# Patient Record
Sex: FEMALE | Race: WHITE | Employment: OTHER | URBAN - NONMETROPOLITAN AREA
[De-identification: names, ages, dates, MRNs, and addresses within clinical notes are randomized per-mention and may not be internally consistent; named-entity substitution may affect disease eponyms.]

---

## 2017-05-28 ENCOUNTER — HOSPITAL ENCOUNTER (EMERGENCY)
Age: 52
Discharge: HOME | End: 2017-05-28
Attending: EMERGENCY MEDICINE
Payer: COMMERCIAL

## 2017-05-28 ENCOUNTER — APPOINTMENT (OUTPATIENT)
Dept: GENERAL RADIOLOGY | Age: 52
End: 2017-05-28
Payer: COMMERCIAL

## 2017-05-28 VITALS
HEART RATE: 71 BPM | TEMPERATURE: 98 F | WEIGHT: 200 LBS | DIASTOLIC BLOOD PRESSURE: 84 MMHG | HEIGHT: 68 IN | SYSTOLIC BLOOD PRESSURE: 141 MMHG | BODY MASS INDEX: 30.31 KG/M2 | RESPIRATION RATE: 18 BRPM | OXYGEN SATURATION: 97 %

## 2017-05-28 DIAGNOSIS — R07.9 CHEST PAIN, UNSPECIFIED TYPE: Primary | ICD-10-CM

## 2017-05-28 LAB
ALBUMIN SERPL-MCNC: 4.2 G/DL (ref 3.5–5.2)
ALP BLD-CCNC: 65 U/L (ref 35–104)
ALT SERPL-CCNC: 50 U/L (ref 5–33)
ANION GAP SERPL CALCULATED.3IONS-SCNC: 12 MMOL/L (ref 7–19)
APTT: 27.8 SEC (ref 26–36.2)
AST SERPL-CCNC: 38 U/L (ref 5–32)
BASOPHILS ABSOLUTE: 0 K/UL (ref 0–0.2)
BASOPHILS RELATIVE PERCENT: 0.3 % (ref 0–1)
BILIRUB SERPL-MCNC: 0.3 MG/DL (ref 0.2–1.2)
BUN BLDV-MCNC: 8 MG/DL (ref 6–20)
CALCIUM SERPL-MCNC: 9.8 MG/DL (ref 8.6–10)
CHLORIDE BLD-SCNC: 98 MMOL/L (ref 98–111)
CO2: 26 MMOL/L (ref 22–29)
CREAT SERPL-MCNC: 0.7 MG/DL (ref 0.5–0.9)
EOSINOPHILS ABSOLUTE: 0 K/UL (ref 0–0.6)
EOSINOPHILS RELATIVE PERCENT: 0 % (ref 0–5)
GFR NON-AFRICAN AMERICAN: >60
GLUCOSE BLD-MCNC: 88 MG/DL (ref 74–109)
HCT VFR BLD CALC: 47.9 % (ref 37–47)
HEMOGLOBIN: 15.6 G/DL (ref 12–16)
INR BLD: 1.02 (ref 0.88–1.18)
LYMPHOCYTES ABSOLUTE: 3.7 K/UL (ref 1.1–4.5)
LYMPHOCYTES RELATIVE PERCENT: 36.6 % (ref 20–40)
MCH RBC QN AUTO: 31.1 PG (ref 27–31)
MCHC RBC AUTO-ENTMCNC: 32.6 G/DL (ref 33–37)
MCV RBC AUTO: 95.6 FL (ref 81–99)
MONOCYTES ABSOLUTE: 0.6 K/UL (ref 0–0.9)
MONOCYTES RELATIVE PERCENT: 6 % (ref 0–10)
NEUTROPHILS ABSOLUTE: 5.8 K/UL (ref 1.5–7.5)
NEUTROPHILS RELATIVE PERCENT: 56.9 % (ref 50–65)
PDW BLD-RTO: 12.9 % (ref 11.5–14.5)
PERFORMED ON: NORMAL
PERFORMED ON: NORMAL
PLATELET # BLD: 212 K/UL (ref 130–400)
PMV BLD AUTO: 9.1 FL (ref 7.4–10.4)
POC TROPONIN I: 0 NG/ML (ref 0–0.08)
POC TROPONIN I: 0 NG/ML (ref 0–0.08)
POTASSIUM SERPL-SCNC: 4 MMOL/L (ref 3.5–5)
PRO-BNP: 136 PG/ML (ref 0–900)
PROTHROMBIN TIME: 13.3 SEC (ref 12–14.6)
RBC # BLD: 5.01 M/UL (ref 4.2–5.4)
SODIUM BLD-SCNC: 136 MMOL/L (ref 136–145)
TOTAL PROTEIN: 8.2 G/DL (ref 6.6–8.7)
WBC # BLD: 10.1 K/UL (ref 4.8–10.8)

## 2017-05-28 PROCEDURE — 99285 EMERGENCY DEPT VISIT HI MDM: CPT

## 2017-05-28 PROCEDURE — 36415 COLL VENOUS BLD VENIPUNCTURE: CPT

## 2017-05-28 PROCEDURE — 99282 EMERGENCY DEPT VISIT SF MDM: CPT | Performed by: EMERGENCY MEDICINE

## 2017-05-28 PROCEDURE — 71010 XR CHEST PORTABLE: CPT

## 2017-05-28 PROCEDURE — 85610 PROTHROMBIN TIME: CPT

## 2017-05-28 PROCEDURE — 6370000000 HC RX 637 (ALT 250 FOR IP): Performed by: EMERGENCY MEDICINE

## 2017-05-28 PROCEDURE — 93005 ELECTROCARDIOGRAM TRACING: CPT

## 2017-05-28 PROCEDURE — 83880 ASSAY OF NATRIURETIC PEPTIDE: CPT

## 2017-05-28 PROCEDURE — 84484 ASSAY OF TROPONIN QUANT: CPT

## 2017-05-28 PROCEDURE — 85025 COMPLETE CBC W/AUTO DIFF WBC: CPT

## 2017-05-28 PROCEDURE — 85730 THROMBOPLASTIN TIME PARTIAL: CPT

## 2017-05-28 PROCEDURE — 80053 COMPREHEN METABOLIC PANEL: CPT

## 2017-05-28 RX ORDER — LEVOTHYROXINE SODIUM 0.12 MG/1
125 TABLET ORAL DAILY
COMMUNITY

## 2017-05-28 RX ORDER — ASPIRIN 325 MG
325 TABLET ORAL ONCE
Status: DISCONTINUED | OUTPATIENT
Start: 2017-05-28 | End: 2017-05-29 | Stop reason: HOSPADM

## 2017-05-28 RX ORDER — LAMOTRIGINE 200 MG/1
200 TABLET ORAL DAILY
COMMUNITY

## 2017-05-28 RX ORDER — ASPIRIN 325 MG
TABLET ORAL
Status: DISCONTINUED
Start: 2017-05-28 | End: 2017-05-28

## 2017-05-28 RX ORDER — ASPIRIN 325 MG
325 TABLET ORAL ONCE
Status: COMPLETED | OUTPATIENT
Start: 2017-05-28 | End: 2017-05-28

## 2017-05-28 RX ORDER — BUSPIRONE HYDROCHLORIDE 5 MG/1
5 TABLET ORAL 3 TIMES DAILY
COMMUNITY

## 2017-05-28 RX ADMIN — ASPIRIN 325 MG ORAL TABLET 325 MG: 325 PILL ORAL at 19:27

## 2017-05-28 ASSESSMENT — ENCOUNTER SYMPTOMS
BACK PAIN: 0
TROUBLE SWALLOWING: 0
ABDOMINAL PAIN: 0
COUGH: 0
CHEST TIGHTNESS: 0
DIARRHEA: 0
RHINORRHEA: 0
ABDOMINAL DISTENTION: 0
SHORTNESS OF BREATH: 0
EYE PAIN: 0
COLOR CHANGE: 0
PHOTOPHOBIA: 0
VOMITING: 0
WHEEZING: 0
CONSTIPATION: 0
NAUSEA: 1
SORE THROAT: 0

## 2017-05-28 ASSESSMENT — PAIN SCALES - GENERAL: PAINLEVEL_OUTOF10: 5

## 2017-05-30 ENCOUNTER — HOSPITAL ENCOUNTER (OUTPATIENT)
Dept: NON INVASIVE DIAGNOSTICS | Age: 52
End: 2017-05-30
Payer: COMMERCIAL

## 2017-05-30 ENCOUNTER — HOSPITAL ENCOUNTER (OUTPATIENT)
Dept: NUCLEAR MEDICINE | Age: 52
End: 2017-05-30
Payer: COMMERCIAL

## 2017-05-30 ENCOUNTER — APPOINTMENT (OUTPATIENT)
Dept: NUCLEAR MEDICINE | Age: 52
End: 2017-05-30
Payer: COMMERCIAL

## 2017-05-31 ENCOUNTER — HOSPITAL ENCOUNTER (OUTPATIENT)
Dept: NUCLEAR MEDICINE | Age: 52
End: 2017-05-31
Payer: COMMERCIAL

## 2017-05-31 ENCOUNTER — HOSPITAL ENCOUNTER (OUTPATIENT)
Dept: NUCLEAR MEDICINE | Age: 52
Discharge: HOME | End: 2017-05-31
Payer: COMMERCIAL

## 2017-05-31 ENCOUNTER — HOSPITAL ENCOUNTER (OUTPATIENT)
Dept: NON INVASIVE DIAGNOSTICS | Age: 52
Discharge: HOME | End: 2017-05-31
Payer: COMMERCIAL

## 2017-05-31 DIAGNOSIS — R07.9 CHEST PAIN IN ADULT: ICD-10-CM

## 2017-05-31 LAB
EKG P AXIS: 67 DEGREES
EKG P AXIS: 72 DEGREES
EKG P-R INTERVAL: 190 MS
EKG P-R INTERVAL: 192 MS
EKG Q-T INTERVAL: 408 MS
EKG Q-T INTERVAL: 422 MS
EKG QRS DURATION: 102 MS
EKG QRS DURATION: 90 MS
EKG QTC CALCULATION (BAZETT): 416 MS
EKG QTC CALCULATION (BAZETT): 427 MS
EKG T AXIS: 47 DEGREES
EKG T AXIS: 50 DEGREES

## 2017-05-31 PROCEDURE — 93017 CV STRESS TEST TRACING ONLY: CPT

## 2017-06-12 ENCOUNTER — HOSPITAL ENCOUNTER (OUTPATIENT)
Dept: NUCLEAR MEDICINE | Age: 52
Discharge: HOME | End: 2017-06-12
Payer: COMMERCIAL

## 2017-06-12 DIAGNOSIS — R07.9 CHEST PAIN IN ADULT: ICD-10-CM

## 2017-06-12 PROCEDURE — 6360000002 HC RX W HCPCS: Performed by: NURSE PRACTITIONER

## 2017-06-12 PROCEDURE — 93017 CV STRESS TEST TRACING ONLY: CPT

## 2017-06-12 PROCEDURE — A9500 TC99M SESTAMIBI: HCPCS | Performed by: NURSE PRACTITIONER

## 2017-06-12 PROCEDURE — 78452 HT MUSCLE IMAGE SPECT MULT: CPT

## 2017-06-12 PROCEDURE — 3430000000 HC RX DIAGNOSTIC RADIOPHARMACEUTICAL: Performed by: NURSE PRACTITIONER

## 2017-06-12 RX ADMIN — TETRAKIS(2-METHOXYISOBUTYLISOCYANIDE)COPPER(I) TETRAFLUOROBORATE 10 MILLICURIE: 1 INJECTION, POWDER, LYOPHILIZED, FOR SOLUTION INTRAVENOUS at 10:31

## 2017-06-12 RX ADMIN — REGADENOSON 0.4 MG: 0.08 INJECTION, SOLUTION INTRAVENOUS at 10:31

## 2017-06-12 RX ADMIN — TETRAKIS(2-METHOXYISOBUTYLISOCYANIDE)COPPER(I) TETRAFLUOROBORATE 30 MILLICURIE: 1 INJECTION, POWDER, LYOPHILIZED, FOR SOLUTION INTRAVENOUS at 10:31

## 2017-06-14 LAB
LV EF: 76 %
LVEF MODALITY: NORMAL

## 2017-06-22 ENCOUNTER — TELEPHONE (OUTPATIENT)
Dept: CARDIOLOGY | Age: 52
End: 2017-06-22

## 2017-06-22 ENCOUNTER — APPOINTMENT (OUTPATIENT)
Dept: GENERAL RADIOLOGY | Facility: HOSPITAL | Age: 52
End: 2017-06-22

## 2017-06-22 ENCOUNTER — HOSPITAL ENCOUNTER (EMERGENCY)
Facility: HOSPITAL | Age: 52
Discharge: HOME | End: 2017-06-23
Attending: EMERGENCY MEDICINE | Admitting: EMERGENCY MEDICINE

## 2017-06-22 DIAGNOSIS — R07.9 CHEST PAIN, UNSPECIFIED TYPE: Primary | ICD-10-CM

## 2017-06-22 LAB
ALBUMIN SERPL-MCNC: 4.2 G/DL (ref 3.5–5)
ALBUMIN/GLOB SERPL: 1.1 G/DL (ref 1.1–2.5)
ALP SERPL-CCNC: 66 U/L (ref 24–120)
ALT SERPL W P-5'-P-CCNC: 62 U/L (ref 0–54)
ANION GAP SERPL CALCULATED.3IONS-SCNC: 9 MMOL/L (ref 4–13)
APTT PPP: 28.9 SECONDS (ref 24.1–34.8)
AST SERPL-CCNC: 62 U/L (ref 7–45)
BASOPHILS # BLD AUTO: 0.04 10*3/MM3 (ref 0–0.2)
BASOPHILS NFR BLD AUTO: 0.4 % (ref 0–2)
BILIRUB SERPL-MCNC: 0.5 MG/DL (ref 0.1–1)
BUN BLD-MCNC: 9 MG/DL (ref 5–21)
BUN/CREAT SERPL: 13.4 (ref 7–25)
CALCIUM SPEC-SCNC: 9.9 MG/DL (ref 8.4–10.4)
CHLORIDE SERPL-SCNC: 102 MMOL/L (ref 98–110)
CK MB SERPL-CCNC: 0.43 NG/ML (ref 0–5)
CO2 SERPL-SCNC: 29 MMOL/L (ref 24–31)
CREAT BLD-MCNC: 0.67 MG/DL (ref 0.5–1.4)
DEPRECATED RDW RBC AUTO: 44.4 FL (ref 40–54)
EOSINOPHIL # BLD AUTO: 0.49 10*3/MM3 (ref 0–0.7)
EOSINOPHIL NFR BLD AUTO: 4.6 % (ref 0–4)
ERYTHROCYTE [DISTWIDTH] IN BLOOD BY AUTOMATED COUNT: 12.9 % (ref 12–15)
GFR SERPL CREATININE-BSD FRML MDRD: 92 ML/MIN/1.73
GLOBULIN UR ELPH-MCNC: 3.7 GM/DL
GLUCOSE BLD-MCNC: 90 MG/DL (ref 70–100)
HCT VFR BLD AUTO: 44.3 % (ref 37–47)
HGB BLD-MCNC: 14.8 G/DL (ref 12–16)
HOLD SPECIMEN: NORMAL
HOLD SPECIMEN: NORMAL
IMM GRANULOCYTES # BLD: 0.01 10*3/MM3 (ref 0–0.03)
IMM GRANULOCYTES NFR BLD: 0.1 % (ref 0–5)
INR PPP: 0.92 (ref 0.91–1.09)
LYMPHOCYTES # BLD AUTO: 3.71 10*3/MM3 (ref 0.72–4.86)
LYMPHOCYTES NFR BLD AUTO: 34.6 % (ref 15–45)
MCH RBC QN AUTO: 31.1 PG (ref 28–32)
MCHC RBC AUTO-ENTMCNC: 33.4 G/DL (ref 33–36)
MCV RBC AUTO: 93.1 FL (ref 82–98)
MONOCYTES # BLD AUTO: 0.67 10*3/MM3 (ref 0.19–1.3)
MONOCYTES NFR BLD AUTO: 6.3 % (ref 4–12)
MYOGLOBIN SERPL-MCNC: 13.7 NG/ML (ref 0–110)
NEUTROPHILS # BLD AUTO: 5.79 10*3/MM3 (ref 1.87–8.4)
NEUTROPHILS NFR BLD AUTO: 54 % (ref 39–78)
NT-PROBNP SERPL-MCNC: 96.8 PG/ML (ref 0–900)
PLATELET # BLD AUTO: 186 10*3/MM3 (ref 130–400)
PMV BLD AUTO: 9.4 FL (ref 6–12)
POTASSIUM BLD-SCNC: 3.9 MMOL/L (ref 3.5–5.3)
PROT SERPL-MCNC: 7.9 G/DL (ref 6.3–8.7)
PROTHROMBIN TIME: 12.6 SECONDS (ref 11.9–14.6)
RBC # BLD AUTO: 4.76 10*6/MM3 (ref 4.2–5.4)
SODIUM BLD-SCNC: 140 MMOL/L (ref 135–145)
TROPONIN I SERPL-MCNC: 0 NG/ML (ref 0–0.07)
WBC NRBC COR # BLD: 10.71 10*3/MM3 (ref 4.8–10.8)
WHOLE BLOOD HOLD SPECIMEN: NORMAL
WHOLE BLOOD HOLD SPECIMEN: NORMAL

## 2017-06-22 PROCEDURE — 83880 ASSAY OF NATRIURETIC PEPTIDE: CPT | Performed by: EMERGENCY MEDICINE

## 2017-06-22 PROCEDURE — 80053 COMPREHEN METABOLIC PANEL: CPT | Performed by: EMERGENCY MEDICINE

## 2017-06-22 PROCEDURE — 83874 ASSAY OF MYOGLOBIN: CPT | Performed by: EMERGENCY MEDICINE

## 2017-06-22 PROCEDURE — 93010 ELECTROCARDIOGRAM REPORT: CPT | Performed by: INTERNAL MEDICINE

## 2017-06-22 PROCEDURE — 99284 EMERGENCY DEPT VISIT MOD MDM: CPT

## 2017-06-22 PROCEDURE — 84484 ASSAY OF TROPONIN QUANT: CPT

## 2017-06-22 PROCEDURE — 93005 ELECTROCARDIOGRAM TRACING: CPT | Performed by: EMERGENCY MEDICINE

## 2017-06-22 PROCEDURE — 71010 HC CHEST PA OR AP: CPT

## 2017-06-22 PROCEDURE — 85610 PROTHROMBIN TIME: CPT | Performed by: EMERGENCY MEDICINE

## 2017-06-22 PROCEDURE — 85025 COMPLETE CBC W/AUTO DIFF WBC: CPT | Performed by: EMERGENCY MEDICINE

## 2017-06-22 PROCEDURE — 85730 THROMBOPLASTIN TIME PARTIAL: CPT | Performed by: EMERGENCY MEDICINE

## 2017-06-22 PROCEDURE — 82553 CREATINE MB FRACTION: CPT | Performed by: EMERGENCY MEDICINE

## 2017-06-22 RX ORDER — LEVOTHYROXINE SODIUM 0.12 MG/1
125 TABLET ORAL DAILY
COMMUNITY

## 2017-06-22 RX ORDER — CLONAZEPAM 1 MG/1
1 TABLET ORAL 3 TIMES DAILY PRN
COMMUNITY
End: 2019-07-19 | Stop reason: DRUGHIGH

## 2017-06-22 RX ORDER — MELOXICAM 15 MG/1
15 TABLET ORAL DAILY
COMMUNITY
End: 2019-07-19 | Stop reason: ALTCHOICE

## 2017-06-23 ENCOUNTER — OFFICE VISIT (OUTPATIENT)
Dept: CARDIOLOGY | Facility: CLINIC | Age: 52
End: 2017-06-23

## 2017-06-23 VITALS
WEIGHT: 200 LBS | SYSTOLIC BLOOD PRESSURE: 128 MMHG | TEMPERATURE: 97.5 F | RESPIRATION RATE: 16 BRPM | DIASTOLIC BLOOD PRESSURE: 72 MMHG | OXYGEN SATURATION: 99 % | BODY MASS INDEX: 30.31 KG/M2 | HEART RATE: 74 BPM | HEIGHT: 68 IN

## 2017-06-23 VITALS
HEART RATE: 101 BPM | DIASTOLIC BLOOD PRESSURE: 90 MMHG | SYSTOLIC BLOOD PRESSURE: 130 MMHG | OXYGEN SATURATION: 93 % | HEIGHT: 68 IN | WEIGHT: 208 LBS | BODY MASS INDEX: 31.52 KG/M2

## 2017-06-23 DIAGNOSIS — R07.2 PRECORDIAL PAIN: ICD-10-CM

## 2017-06-23 DIAGNOSIS — R94.39 ABNORMAL NUCLEAR STRESS TEST: ICD-10-CM

## 2017-06-23 DIAGNOSIS — I10 ESSENTIAL HYPERTENSION: Primary | ICD-10-CM

## 2017-06-23 DIAGNOSIS — Z82.49 FAMILY HISTORY OF EARLY CAD: ICD-10-CM

## 2017-06-23 DIAGNOSIS — F17.200 SMOKER: ICD-10-CM

## 2017-06-23 LAB — TROPONIN I SERPL-MCNC: 0 NG/ML (ref 0–0.07)

## 2017-06-23 PROCEDURE — 99214 OFFICE O/P EST MOD 30 MIN: CPT | Performed by: INTERNAL MEDICINE

## 2017-06-23 PROCEDURE — 84484 ASSAY OF TROPONIN QUANT: CPT

## 2017-06-23 PROCEDURE — 93005 ELECTROCARDIOGRAM TRACING: CPT | Performed by: EMERGENCY MEDICINE

## 2017-06-23 PROCEDURE — 93000 ELECTROCARDIOGRAM COMPLETE: CPT | Performed by: INTERNAL MEDICINE

## 2017-06-23 PROCEDURE — 93010 ELECTROCARDIOGRAM REPORT: CPT | Performed by: INTERNAL MEDICINE

## 2017-06-23 RX ORDER — NITROGLYCERIN 0.4 MG/1
TABLET SUBLINGUAL
Refills: 0 | COMMUNITY
Start: 2017-06-19

## 2017-06-23 RX ORDER — ATENOLOL 25 MG/1
25 TABLET ORAL DAILY
Qty: 30 TABLET | Refills: 11 | Status: SHIPPED | OUTPATIENT
Start: 2017-06-23 | End: 2019-07-19 | Stop reason: ALTCHOICE

## 2017-06-23 RX ORDER — SODIUM CHLORIDE 0.9 % (FLUSH) 0.9 %
1-10 SYRINGE (ML) INJECTION AS NEEDED
Status: CANCELLED | OUTPATIENT
Start: 2017-06-23

## 2017-06-23 RX ORDER — SODIUM CHLORIDE 9 MG/ML
3 INJECTION, SOLUTION INTRAVENOUS ONCE
Status: CANCELLED | OUTPATIENT
Start: 2017-06-23 | End: 2017-06-23

## 2017-06-23 RX ORDER — IPRATROPIUM BROMIDE 17 UG/1
AEROSOL, METERED RESPIRATORY (INHALATION)
Refills: 5 | COMMUNITY
Start: 2017-06-05 | End: 2019-07-19 | Stop reason: ALTCHOICE

## 2017-06-23 RX ORDER — METAXALONE 800 MG/1
800 TABLET ORAL DAILY PRN
Refills: 1 | COMMUNITY
Start: 2017-06-07 | End: 2019-07-19

## 2017-06-23 NOTE — ED NOTES
EXPLAINED TO PT THAT IT IS REQUIRED TO KEEP HER ON A MONITOR SINCE SHE IS HERE FOR CP. THIS NURSE CONNECTED PT BACK TO MONITOR.     Rama Gutierrez, ANDREA  06/22/17 [id number]

## 2017-06-23 NOTE — PROGRESS NOTES
Judie Alba  [id number]  1965  51 y.o.  female    Referring Provider: Brian Weston MD    Reason for Visit:Chest pain  Chest pain with exertion  Moderate pressure like x 1 year now worsening  Lasts for 5 minutes  Associated diaphoresis  Associated nausea  Left arm and left jaw radiation  Precipitated with exertion  Mild dypnea   Compliant with medications    History of present illness:  Judie Alba is a 51 y.o. yo female with history of HTN who presents today for   Chief Complaint   Patient presents with   • ABNORMAL STRESS TEST     NEW PT   • Hypertension   • Chest Pain   .    History  Past Medical History:   Diagnosis Date   • Anxiety    • Arthritis    • Chest pain    • Hepatitis C    • HTN (hypertension)    • Hypothyroid    ,   Past Surgical History:   Procedure Laterality Date   • CHOLECYSTECTOMY     • DILATION AND CURETTAGE, DIAGNOSTIC / THERAPEUTIC     • ENDOMETRIAL ABLATION     ,   Family History   Problem Relation Age of Onset   • Heart disease Mother    • Hyperlipidemia Mother    • Hypertension Mother    • Heart disease Father    • Hyperlipidemia Father    • Hypertension Father    ,   Social History   Substance Use Topics   • Smoking status: Current Every Day Smoker     Packs/day: 2.00     Types: Cigarettes   • Smokeless tobacco: None      Comment: OCCASIONAL    • Alcohol use No   ,     Medications  Current Outpatient Prescriptions   Medication Sig Dispense Refill   • ATROVENT HFA 17 MCG/ACT inhaler INHALE 2 PUFFS PO QID  5   • CALCIUM & MAGNESIUM CARBONATES PO Take  by mouth.     • clonazePAM (KlonoPIN) 1 MG tablet Take 1 mg by mouth 3 (Three) Times a Day As Needed for Seizures.     • Digestive Enzymes (DIGESTIVE ENZYME PO) Take  by mouth.     • IODINE, KELP, PO Take  by mouth.     • levothyroxine (SYNTHROID, LEVOTHROID) 125 MCG tablet Take 125 mcg by mouth Daily.     • meloxicam (MOBIC) 15 MG tablet Take 15 mg by mouth Daily.     • metaxalone (SKELAXIN) 800 MG tablet   1   • Misc Natural  "Products (ADRENAL PO) Take  by mouth As Needed.     • nitroglycerin (NITROSTAT) 0.4 MG SL tablet TK 1 T PO UNDER TONGUE Q 5 MIN UP TO 3 TABS PRN  0   • Unable to find 1 each 1 (One) Time. CATATPLEX E DAILY     • atenolol (TENORMIN) 25 MG tablet Take 1 tablet by mouth Daily. 30 tablet 11     No current facility-administered medications for this visit.        Allergies:  Codeine    Review of Systems  Review of Systems   Constitution: Negative.   HENT: Negative.    Eyes: Negative.    Cardiovascular: Positive for chest pain and dyspnea on exertion. Negative for claudication, cyanosis, irregular heartbeat, leg swelling, near-syncope, orthopnea, palpitations, paroxysmal nocturnal dyspnea and syncope.   Respiratory: Negative.    Endocrine: Negative.    Hematologic/Lymphatic: Negative.    Skin: Negative.    Gastrointestinal: Negative for anorexia.   Genitourinary: Negative.    Neurological: Negative.    Psychiatric/Behavioral: Negative.        Objective     Physical Exam:  /90  Pulse 101  Ht 68\" (172.7 cm)  Wt 208 lb (94.3 kg)  SpO2 93%  BMI 31.63 kg/m2  Physical Exam   Constitutional: She appears well-developed.   HENT:   Head: Normocephalic.   Neck: Normal carotid pulses and no JVD present. No tracheal tenderness present. Carotid bruit is not present. No tracheal deviation and no edema present.   Cardiovascular: Regular rhythm, normal heart sounds and normal pulses.    Pulmonary/Chest: Effort normal. No stridor.   Abdominal: Soft.   Neurological: She is alert. She has normal strength. No cranial nerve deficit or sensory deficit.   Skin: Skin is warm.   Psychiatric: She has a normal mood and affect. Her speech is normal and behavior is normal.       Results Review:       ECG 12 Lead  Date/Time: 6/23/2017 9:39 AM  Performed by: STEF YOUNG  Authorized by: STEF YOUNG               Assessment/Plan   Patient Active Problem List   Diagnosis   • Chest pain   • HTN (hypertension)   • Smoker 2 PPD X 30 years   • " Family history of early CAD   • Abnormal nuclear stress test Apical ischemia Francesca       No palpitations. No significant pedal edema. Compliant with medications and diet. Latest labs and medications reviewed.  UGIE in past apparently normal a few years ago    Plan:  Recommend cardiac catheterization, selective coronary angiography, left ventriculography and percutaneous coronary intervention with application of arteriotomy hemostatic closure device.  I discussed cardiac catheterization, the procedure, risks (including bleeding, infection, vascular damage [including minor oozing, bruising, bleeding, and up to and including but not limited to the need for vascular surgery, emergency cardiothoracic surgery, contrast reaction, renal failure, respiratory failure, heart attack, stroke, arrhythmia and even death), benefits, and alternatives and the patient has voiced understanding and is willing to proceed.  No contraindication to drug eluting stent placement if required  Further recommendations pending results of cardiac catheterization    Start ASA 81 mg daily  Atenlol 25 mg daily  S/L NTG PRN for chest pain  Close follow up with you as scheduled.  Intensive factor modifications.  See order list.    Counseled regarding disease appropriate diet, fluid, caffeine, stimulants and sodium intake as well as importance of compliance to diet, exercise and regular follow up.  Avoid NSAIDS and COX2 inhibitors. Use Acetaminophen PRN.    Return in about 4 months (around 10/23/2017).

## 2017-06-23 NOTE — ED PROVIDER NOTES
Subjective   HPI Comments: Pt c/o intermittent symptoms of chest pain for the past 3 days.  This afternoon the chest pain developed again.  She took one nitroglycerin with little to no relief of the discomfort.  She did not experience any shortness of breath, nausea/vomiting or diaphoresis associated with the chest pain.  She best describes the chest pain as a pressure that is non-radiating.        History provided by:  Patient      Review of Systems   Constitutional: Negative for activity change, fatigue and fever.   HENT: Negative for congestion, ear pain, facial swelling, postnasal drip, rhinorrhea, sinus pressure, sore throat and trouble swallowing.    Eyes: Negative for photophobia and redness.   Respiratory: Negative for chest tightness, shortness of breath and wheezing.    Cardiovascular: Positive for chest pain. Negative for palpitations and leg swelling.   Gastrointestinal: Negative for abdominal distention, abdominal pain, diarrhea, nausea and vomiting.   Genitourinary: Negative for difficulty urinating, dysuria and flank pain.   Musculoskeletal: Negative for back pain and neck pain.   Skin: Negative for color change and wound.   Neurological: Negative for dizziness, speech difficulty, weakness, light-headedness and headaches.   Psychiatric/Behavioral: Negative for agitation, confusion, self-injury and suicidal ideas.       Past Medical History:   Diagnosis Date   • Anxiety    • Arthritis    • Hepatitis C    • Hypothyroid        Allergies   Allergen Reactions   • Codeine Itching       Past Surgical History:   Procedure Laterality Date   • CHOLECYSTECTOMY     • DILATION AND CURETTAGE, DIAGNOSTIC / THERAPEUTIC         History reviewed. No pertinent family history.    Social History     Social History   • Marital status: Single     Spouse name: N/A   • Number of children: N/A   • Years of education: N/A     Social History Main Topics   • Smoking status: Current Every Day Smoker     Types: Cigarettes   •  Smokeless tobacco: None      Comment: OCCASIONAL    • Alcohol use No   • Drug use: No   • Sexual activity: Defer     Other Topics Concern   • None     Social History Narrative   • None           Objective   Physical Exam   Constitutional: She is oriented to person, place, and time. She appears well-developed and well-nourished. No distress.   HENT:   Head: Normocephalic and atraumatic.   Mouth/Throat: Oropharynx is clear and moist. No oropharyngeal exudate.   Eyes: EOM are normal. Pupils are equal, round, and reactive to light.   Neck: Normal range of motion. Neck supple. No JVD present.   Cardiovascular: Normal rate, regular rhythm and normal heart sounds.  Exam reveals no friction rub.    No murmur heard.  Pulmonary/Chest: Effort normal and breath sounds normal. No respiratory distress. She has no wheezes. She has no rales.   Abdominal: Soft. Bowel sounds are normal. She exhibits no distension. There is no tenderness. There is no rebound and no guarding.   Neurological: She is alert and oriented to person, place, and time. No cranial nerve deficit.   Skin: Skin is warm and dry. No rash noted.   Psychiatric: She has a normal mood and affect. Her behavior is normal. Judgment and thought content normal.   Nursing note and vitals reviewed.      Procedures         ED Course  ED Course   Value Comment By Time   ECG 12 Lead All sinus rhythm with a rate of 72, normal axis, no acute ST elevations or depressions. Mil Restrepo MD 06/23 5935   ECG 12 Lead Repeat 12-lead normal sinus rhythm with a rate of 79, normal axis, no acute ST elevations or depressions.  No acute changes from the initial EKG. Mil Restrepo MD 06/23 0035                  MDM  Number of Diagnoses or Management Options  Chest pain, unspecified type: new and requires workup  Diagnosis management comments: The workup did not show any acute findings for patient's chest pain.  She has had 2 negative enzymes and 2 negative EKGs.  She has follow-up with  Dr. Hutchinson in the morning.  She is not currently having chest pain.  Pt instructed to return to the ED if they has any further issues or new complaints.          Amount and/or Complexity of Data Reviewed  Clinical lab tests: ordered and reviewed  Tests in the radiology section of CPT®: ordered and reviewed  Tests in the medicine section of CPT®: ordered and reviewed  Independent visualization of images, tracings, or specimens: yes    Risk of Complications, Morbidity, and/or Mortality  Presenting problems: moderate  Diagnostic procedures: moderate  Management options: moderate    Patient Progress  Patient progress: stable      Final diagnoses:   Chest pain, unspecified type            Mil Restrepo MD  06/23/17 0050

## 2017-06-28 ENCOUNTER — HOSPITAL ENCOUNTER (OUTPATIENT)
Facility: HOSPITAL | Age: 52
Discharge: HOME | End: 2017-06-28
Attending: INTERNAL MEDICINE | Admitting: INTERNAL MEDICINE

## 2017-06-28 VITALS
OXYGEN SATURATION: 96 % | HEIGHT: 68 IN | HEART RATE: 70 BPM | SYSTOLIC BLOOD PRESSURE: 121 MMHG | BODY MASS INDEX: 31.13 KG/M2 | DIASTOLIC BLOOD PRESSURE: 86 MMHG | WEIGHT: 205.38 LBS | RESPIRATION RATE: 25 BRPM | TEMPERATURE: 98.8 F

## 2017-06-28 DIAGNOSIS — R07.2 PRECORDIAL PAIN: ICD-10-CM

## 2017-06-28 PROCEDURE — 93458 L HRT ARTERY/VENTRICLE ANGIO: CPT | Performed by: INTERNAL MEDICINE

## 2017-06-28 PROCEDURE — 93010 ELECTROCARDIOGRAM REPORT: CPT | Performed by: INTERNAL MEDICINE

## 2017-06-28 PROCEDURE — 99152 MOD SED SAME PHYS/QHP 5/>YRS: CPT | Performed by: INTERNAL MEDICINE

## 2017-06-28 PROCEDURE — C1894 INTRO/SHEATH, NON-LASER: HCPCS | Performed by: INTERNAL MEDICINE

## 2017-06-28 PROCEDURE — 0 IOPAMIDOL PER 1 ML: Performed by: INTERNAL MEDICINE

## 2017-06-28 PROCEDURE — S0260 H&P FOR SURGERY: HCPCS | Performed by: INTERNAL MEDICINE

## 2017-06-28 PROCEDURE — 25010000002 MIDAZOLAM PER 1 MG: Performed by: INTERNAL MEDICINE

## 2017-06-28 PROCEDURE — C1769 GUIDE WIRE: HCPCS | Performed by: INTERNAL MEDICINE

## 2017-06-28 PROCEDURE — 93005 ELECTROCARDIOGRAM TRACING: CPT | Performed by: INTERNAL MEDICINE

## 2017-06-28 PROCEDURE — 25010000002 DIPHENHYDRAMINE PER 50 MG: Performed by: INTERNAL MEDICINE

## 2017-06-28 PROCEDURE — 25010000002 FENTANYL CITRATE (PF) 100 MCG/2ML SOLUTION: Performed by: INTERNAL MEDICINE

## 2017-06-28 RX ORDER — LIDOCAINE HYDROCHLORIDE 20 MG/ML
INJECTION, SOLUTION INFILTRATION; PERINEURAL AS NEEDED
Status: DISCONTINUED | OUTPATIENT
Start: 2017-06-28 | End: 2017-06-28 | Stop reason: HOSPADM

## 2017-06-28 RX ORDER — ACETAMINOPHEN 325 MG/1
650 TABLET ORAL EVERY 4 HOURS PRN
Status: DISCONTINUED | OUTPATIENT
Start: 2017-06-28 | End: 2017-06-28 | Stop reason: HOSPADM

## 2017-06-28 RX ORDER — SODIUM CHLORIDE 9 MG/ML
3 INJECTION, SOLUTION INTRAVENOUS ONCE
Status: COMPLETED | OUTPATIENT
Start: 2017-06-28 | End: 2017-06-28

## 2017-06-28 RX ORDER — SODIUM CHLORIDE 9 MG/ML
100 INJECTION, SOLUTION INTRAVENOUS CONTINUOUS
Status: DISCONTINUED | OUTPATIENT
Start: 2017-06-28 | End: 2017-06-28 | Stop reason: HOSPADM

## 2017-06-28 RX ORDER — SODIUM CHLORIDE 0.9 % (FLUSH) 0.9 %
1-10 SYRINGE (ML) INJECTION AS NEEDED
Status: DISCONTINUED | OUTPATIENT
Start: 2017-06-28 | End: 2017-06-28 | Stop reason: HOSPADM

## 2017-06-28 RX ORDER — ASPIRIN 81 MG/1
81 TABLET ORAL DAILY
COMMUNITY
End: 2019-07-19

## 2017-06-28 RX ORDER — DIPHENHYDRAMINE HYDROCHLORIDE 50 MG/ML
INJECTION INTRAMUSCULAR; INTRAVENOUS AS NEEDED
Status: DISCONTINUED | OUTPATIENT
Start: 2017-06-28 | End: 2017-06-28 | Stop reason: HOSPADM

## 2017-06-28 RX ORDER — FENTANYL CITRATE 50 UG/ML
INJECTION, SOLUTION INTRAMUSCULAR; INTRAVENOUS AS NEEDED
Status: DISCONTINUED | OUTPATIENT
Start: 2017-06-28 | End: 2017-06-28 | Stop reason: HOSPADM

## 2017-06-28 RX ORDER — MIDAZOLAM HYDROCHLORIDE 1 MG/ML
INJECTION INTRAMUSCULAR; INTRAVENOUS AS NEEDED
Status: DISCONTINUED | OUTPATIENT
Start: 2017-06-28 | End: 2017-06-28 | Stop reason: HOSPADM

## 2017-06-28 RX ADMIN — ACETAMINOPHEN 650 MG: 325 TABLET ORAL at 16:02

## 2017-06-28 RX ADMIN — SODIUM CHLORIDE 3 ML/KG/HR: 9 INJECTION, SOLUTION INTRAVENOUS at 12:50

## 2017-07-06 ENCOUNTER — APPOINTMENT (OUTPATIENT)
Dept: GENERAL RADIOLOGY | Facility: HOSPITAL | Age: 52
End: 2017-07-06
Attending: INTERNAL MEDICINE

## 2017-07-26 ENCOUNTER — HOSPITAL ENCOUNTER (OUTPATIENT)
Dept: GENERAL RADIOLOGY | Facility: HOSPITAL | Age: 52
End: 2017-07-26
Attending: INTERNAL MEDICINE

## 2018-03-09 ENCOUNTER — TRANSCRIBE ORDERS (OUTPATIENT)
Dept: ADMINISTRATIVE | Facility: HOSPITAL | Age: 53
End: 2018-03-09

## 2018-03-09 DIAGNOSIS — M54.5 LOW BACK PAIN, UNSPECIFIED BACK PAIN LATERALITY, UNSPECIFIED CHRONICITY, WITH SCIATICA PRESENCE UNSPECIFIED: Primary | ICD-10-CM

## 2018-03-14 ENCOUNTER — APPOINTMENT (OUTPATIENT)
Dept: MRI IMAGING | Facility: HOSPITAL | Age: 53
End: 2018-03-14

## 2018-04-03 ENCOUNTER — APPOINTMENT (OUTPATIENT)
Dept: MRI IMAGING | Facility: HOSPITAL | Age: 53
End: 2018-04-03

## 2019-07-19 ENCOUNTER — LAB (OUTPATIENT)
Dept: LAB | Facility: HOSPITAL | Age: 54
End: 2019-07-19

## 2019-07-19 ENCOUNTER — TELEPHONE (OUTPATIENT)
Dept: INTERNAL MEDICINE | Facility: CLINIC | Age: 54
End: 2019-07-19

## 2019-07-19 ENCOUNTER — OFFICE VISIT (OUTPATIENT)
Dept: INTERNAL MEDICINE | Facility: CLINIC | Age: 54
End: 2019-07-19

## 2019-07-19 VITALS
RESPIRATION RATE: 16 BRPM | HEART RATE: 76 BPM | BODY MASS INDEX: 28.81 KG/M2 | TEMPERATURE: 97.6 F | SYSTOLIC BLOOD PRESSURE: 124 MMHG | HEIGHT: 68 IN | WEIGHT: 190.13 LBS | OXYGEN SATURATION: 96 % | DIASTOLIC BLOOD PRESSURE: 80 MMHG

## 2019-07-19 DIAGNOSIS — J44.9 CHRONIC OBSTRUCTIVE PULMONARY DISEASE, UNSPECIFIED COPD TYPE (HCC): ICD-10-CM

## 2019-07-19 DIAGNOSIS — Z87.440 HISTORY OF UTI: ICD-10-CM

## 2019-07-19 DIAGNOSIS — Z79.899 CHRONIC PRESCRIPTION BENZODIAZEPINE USE: ICD-10-CM

## 2019-07-19 DIAGNOSIS — N30.01 ACUTE CYSTITIS WITH HEMATURIA: ICD-10-CM

## 2019-07-19 DIAGNOSIS — F31.9 BIPOLAR 1 DISORDER (HCC): ICD-10-CM

## 2019-07-19 DIAGNOSIS — J30.9 ALLERGIC RHINITIS, UNSPECIFIED SEASONALITY, UNSPECIFIED TRIGGER: ICD-10-CM

## 2019-07-19 DIAGNOSIS — F31.9 BIPOLAR 1 DISORDER (HCC): Primary | ICD-10-CM

## 2019-07-19 DIAGNOSIS — F43.21 GRIEF: ICD-10-CM

## 2019-07-19 DIAGNOSIS — F17.200 SMOKER: ICD-10-CM

## 2019-07-19 LAB
AMPHET+METHAMPHET UR QL: NEGATIVE
BACTERIA UR QL AUTO: ABNORMAL /HPF
BARBITURATES UR QL SCN: NEGATIVE
BENZODIAZ UR QL SCN: NEGATIVE
BILIRUB UR QL STRIP: NEGATIVE
CANNABINOIDS SERPL QL: NEGATIVE
CLARITY UR: CLEAR
COCAINE UR QL: NEGATIVE
COLOR UR: ABNORMAL
GLUCOSE UR STRIP-MCNC: NEGATIVE MG/DL
HGB UR QL STRIP.AUTO: ABNORMAL
HYALINE CASTS UR QL AUTO: ABNORMAL /LPF
KETONES UR QL STRIP: NEGATIVE
LEUKOCYTE ESTERASE UR QL STRIP.AUTO: ABNORMAL
METHADONE UR QL SCN: NEGATIVE
NITRITE UR QL STRIP: POSITIVE
OPIATES UR QL: NEGATIVE
PCP UR QL SCN: NEGATIVE
PH UR STRIP.AUTO: 5.5 [PH] (ref 5–8)
PROT UR QL STRIP: NEGATIVE
RBC # UR: ABNORMAL /HPF
REF LAB TEST METHOD: ABNORMAL
SP GR UR STRIP: 1.01 (ref 1–1.03)
SQUAMOUS #/AREA URNS HPF: ABNORMAL /HPF
UROBILINOGEN UR QL STRIP: ABNORMAL
WBC UR QL AUTO: ABNORMAL /HPF

## 2019-07-19 PROCEDURE — 80307 DRUG TEST PRSMV CHEM ANLYZR: CPT | Performed by: FAMILY MEDICINE

## 2019-07-19 PROCEDURE — 87088 URINE BACTERIA CULTURE: CPT | Performed by: FAMILY MEDICINE

## 2019-07-19 PROCEDURE — 81001 URINALYSIS AUTO W/SCOPE: CPT | Performed by: FAMILY MEDICINE

## 2019-07-19 PROCEDURE — 87086 URINE CULTURE/COLONY COUNT: CPT | Performed by: FAMILY MEDICINE

## 2019-07-19 PROCEDURE — 99204 OFFICE O/P NEW MOD 45 MIN: CPT | Performed by: FAMILY MEDICINE

## 2019-07-19 PROCEDURE — 87186 SC STD MICRODIL/AGAR DIL: CPT | Performed by: FAMILY MEDICINE

## 2019-07-19 RX ORDER — CLONAZEPAM 2 MG/1
1 TABLET ORAL 3 TIMES DAILY PRN
Refills: 0 | COMMUNITY
Start: 2019-06-14 | End: 2019-09-03 | Stop reason: SDUPTHER

## 2019-07-19 RX ORDER — CYCLOBENZAPRINE HCL 5 MG
1 TABLET ORAL EVERY 8 HOURS SCHEDULED
COMMUNITY

## 2019-07-19 RX ORDER — PAROXETINE HYDROCHLORIDE 40 MG/1
40 TABLET, FILM COATED ORAL DAILY
Refills: 5 | COMMUNITY
Start: 2019-06-12

## 2019-07-19 RX ORDER — LAMOTRIGINE 200 MG/1
1 TABLET, EXTENDED RELEASE ORAL NIGHTLY
Refills: 5 | COMMUNITY
Start: 2019-06-12

## 2019-07-19 RX ORDER — IPRATROPIUM/ALBUTEROL SULFATE 20-100 MCG
MIST INHALER (GRAM) INHALATION
Refills: 11 | COMMUNITY
Start: 2019-06-13

## 2019-07-19 RX ORDER — CLONAZEPAM 2 MG/1
1-2 TABLET ORAL 3 TIMES DAILY PRN
Qty: 90 TABLET | Refills: 0 | Status: ON HOLD | OUTPATIENT
Start: 2019-07-19 | End: 2019-08-03

## 2019-07-19 RX ORDER — BISOPROLOL FUMARATE AND HYDROCHLOROTHIAZIDE 5; 6.25 MG/1; MG/1
1 TABLET ORAL DAILY
Refills: 3 | COMMUNITY
Start: 2019-06-12

## 2019-07-19 RX ORDER — NITROFURANTOIN 25; 75 MG/1; MG/1
100 CAPSULE ORAL 2 TIMES DAILY
Qty: 10 CAPSULE | Refills: 0 | Status: SHIPPED | OUTPATIENT
Start: 2019-07-19 | End: 2019-07-24

## 2019-07-19 NOTE — TELEPHONE ENCOUNTER
----- Message from Nilesh Costa DO sent at 7/19/2019  2:52 PM CDT -----  UDS neg  Sending in macrobid for cystitis, please let her know

## 2019-07-21 LAB — BACTERIA SPEC AEROBE CULT: ABNORMAL

## 2019-07-22 ENCOUNTER — TELEPHONE (OUTPATIENT)
Dept: INTERNAL MEDICINE | Facility: CLINIC | Age: 54
End: 2019-07-22

## 2019-07-22 RX ORDER — CIPROFLOXACIN 250 MG/1
250 TABLET, FILM COATED ORAL 2 TIMES DAILY
Qty: 6 TABLET | Refills: 0 | Status: SHIPPED | OUTPATIENT
Start: 2019-07-22 | End: 2019-07-25

## 2019-07-22 NOTE — TELEPHONE ENCOUNTER
----- Message from Nilesh Costa DO sent at 7/22/2019 11:42 AM CDT -----  Patient urine culture grew Pseudomonas, so I will send in a 3-day treatment with Cipro.  Please let her know that it is been sent to the pharmacy, and she should stop her calcium supplement while she is on the antibiotic.

## 2019-07-25 ENCOUNTER — APPOINTMENT (OUTPATIENT)
Dept: PULMONOLOGY | Facility: HOSPITAL | Age: 54
End: 2019-07-25

## 2019-07-29 ENCOUNTER — TELEPHONE (OUTPATIENT)
Dept: INTERNAL MEDICINE | Facility: CLINIC | Age: 54
End: 2019-07-29

## 2019-07-29 DIAGNOSIS — E03.9 HYPOTHYROIDISM, UNSPECIFIED TYPE: Primary | ICD-10-CM

## 2019-07-29 NOTE — TELEPHONE ENCOUNTER
If you will enter the orders and let me know, I will send to [facility] for her.    ELLIOT

## 2019-07-29 NOTE — TELEPHONE ENCOUNTER
PT is wanting a blood work order sent to [facility] lab to check her Thyroid. She stated that she hasn't had any done in a while and would like to have it before she sees the doctor on 9/3.

## 2019-08-03 ENCOUNTER — HOSPITAL ENCOUNTER (INPATIENT)
Facility: HOSPITAL | Age: 54
LOS: 1 days | Discharge: HOME | End: 2019-08-04
Attending: NEUROLOGICAL SURGERY | Admitting: NEUROLOGICAL SURGERY

## 2019-08-03 DIAGNOSIS — Z74.09 DECREASED MOBILITY AND ENDURANCE: ICD-10-CM

## 2019-08-03 DIAGNOSIS — S06.5XAA SDH (SUBDURAL HEMATOMA) (HCC): Primary | ICD-10-CM

## 2019-08-03 LAB
ALBUMIN SERPL-MCNC: 3.9 G/DL (ref 3.5–5)
ALBUMIN/GLOB SERPL: 1.1 G/DL (ref 1.1–2.5)
ALP SERPL-CCNC: 60 U/L (ref 24–120)
ALT SERPL W P-5'-P-CCNC: 49 U/L (ref 0–54)
ANION GAP SERPL CALCULATED.3IONS-SCNC: 7 MMOL/L (ref 4–13)
AST SERPL-CCNC: 45 U/L (ref 7–45)
B-HCG UR QL: NEGATIVE
BILIRUB SERPL-MCNC: 0.5 MG/DL (ref 0.1–1)
BUN BLD-MCNC: 5 MG/DL (ref 5–21)
BUN/CREAT SERPL: 8.9 (ref 7–25)
CALCIUM SPEC-SCNC: 8.9 MG/DL (ref 8.4–10.4)
CHLORIDE SERPL-SCNC: 105 MMOL/L (ref 98–110)
CO2 SERPL-SCNC: 28 MMOL/L (ref 24–31)
CREAT BLD-MCNC: 0.56 MG/DL (ref 0.5–1.4)
DEPRECATED RDW RBC AUTO: 47.2 FL (ref 40–54)
ERYTHROCYTE [DISTWIDTH] IN BLOOD BY AUTOMATED COUNT: 13.2 % (ref 12–15)
GFR SERPL CREATININE-BSD FRML MDRD: 113 ML/MIN/1.73
GLOBULIN UR ELPH-MCNC: 3.4 GM/DL
GLUCOSE BLD-MCNC: 92 MG/DL (ref 70–100)
HCT VFR BLD AUTO: 42.6 % (ref 37–47)
HGB BLD-MCNC: 14.4 G/DL (ref 12–16)
MCH RBC QN AUTO: 32.5 PG (ref 28–32)
MCHC RBC AUTO-ENTMCNC: 33.8 G/DL (ref 33–36)
MCV RBC AUTO: 96.2 FL (ref 82–98)
PLATELET # BLD AUTO: 197 10*3/MM3 (ref 130–400)
PMV BLD AUTO: 8.7 FL (ref 6–12)
POTASSIUM BLD-SCNC: 4.3 MMOL/L (ref 3.5–5.3)
PROT SERPL-MCNC: 7.3 G/DL (ref 6.3–8.7)
RBC # BLD AUTO: 4.43 10*6/MM3 (ref 4.2–5.4)
SODIUM BLD-SCNC: 140 MMOL/L (ref 135–145)
WBC NRBC COR # BLD: 9.78 10*3/MM3 (ref 4.8–10.8)

## 2019-08-03 PROCEDURE — 99222 1ST HOSP IP/OBS MODERATE 55: CPT | Performed by: NEUROLOGICAL SURGERY

## 2019-08-03 PROCEDURE — 94799 UNLISTED PULMONARY SVC/PX: CPT

## 2019-08-03 PROCEDURE — 99406 BEHAV CHNG SMOKING 3-10 MIN: CPT

## 2019-08-03 PROCEDURE — 80053 COMPREHEN METABOLIC PANEL: CPT | Performed by: NEUROLOGICAL SURGERY

## 2019-08-03 PROCEDURE — 81025 URINE PREGNANCY TEST: CPT | Performed by: NEUROLOGICAL SURGERY

## 2019-08-03 PROCEDURE — 85027 COMPLETE CBC AUTOMATED: CPT | Performed by: NEUROLOGICAL SURGERY

## 2019-08-03 RX ORDER — SODIUM CHLORIDE 0.9 % (FLUSH) 0.9 %
3 SYRINGE (ML) INJECTION EVERY 12 HOURS SCHEDULED
Status: DISCONTINUED | OUTPATIENT
Start: 2019-08-03 | End: 2019-08-04 | Stop reason: HOSPADM

## 2019-08-03 RX ORDER — LORAZEPAM 2 MG/ML
2 INJECTION INTRAMUSCULAR
Status: DISCONTINUED | OUTPATIENT
Start: 2019-08-03 | End: 2019-08-04 | Stop reason: HOSPADM

## 2019-08-03 RX ORDER — LORAZEPAM 1 MG/1
2 TABLET ORAL
Status: DISCONTINUED | OUTPATIENT
Start: 2019-08-03 | End: 2019-08-04 | Stop reason: HOSPADM

## 2019-08-03 RX ORDER — METOPROLOL TARTRATE 5 MG/5ML
5 INJECTION INTRAVENOUS EVERY 6 HOURS PRN
Status: DISCONTINUED | OUTPATIENT
Start: 2019-08-03 | End: 2019-08-04 | Stop reason: HOSPADM

## 2019-08-03 RX ORDER — ONDANSETRON 4 MG/1
4 TABLET, FILM COATED ORAL EVERY 6 HOURS PRN
Status: DISCONTINUED | OUTPATIENT
Start: 2019-08-03 | End: 2019-08-04 | Stop reason: HOSPADM

## 2019-08-03 RX ORDER — OXYCODONE HYDROCHLORIDE AND ACETAMINOPHEN 5; 325 MG/1; MG/1
1 TABLET ORAL EVERY 4 HOURS PRN
Status: DISCONTINUED | OUTPATIENT
Start: 2019-08-03 | End: 2019-08-04 | Stop reason: HOSPADM

## 2019-08-03 RX ORDER — LORAZEPAM 1 MG/1
1 TABLET ORAL
Status: DISCONTINUED | OUTPATIENT
Start: 2019-08-03 | End: 2019-08-04 | Stop reason: HOSPADM

## 2019-08-03 RX ORDER — NICOTINE 21 MG/24HR
1 PATCH, TRANSDERMAL 24 HOURS TRANSDERMAL EVERY 24 HOURS
Status: DISCONTINUED | OUTPATIENT
Start: 2019-08-03 | End: 2019-08-04 | Stop reason: HOSPADM

## 2019-08-03 RX ORDER — ACETAMINOPHEN 650 MG/1
650 SUPPOSITORY RECTAL EVERY 4 HOURS PRN
Status: DISCONTINUED | OUTPATIENT
Start: 2019-08-03 | End: 2019-08-04 | Stop reason: HOSPADM

## 2019-08-03 RX ORDER — CLONAZEPAM 1 MG/1
2 TABLET ORAL EVERY 8 HOURS SCHEDULED
Status: DISCONTINUED | OUTPATIENT
Start: 2019-08-03 | End: 2019-08-03

## 2019-08-03 RX ORDER — CLONAZEPAM 1 MG/1
2 TABLET ORAL 3 TIMES DAILY PRN
Status: DISCONTINUED | OUTPATIENT
Start: 2019-08-03 | End: 2019-08-04 | Stop reason: HOSPADM

## 2019-08-03 RX ORDER — LORAZEPAM 2 MG/ML
4 INJECTION INTRAMUSCULAR
Status: DISCONTINUED | OUTPATIENT
Start: 2019-08-03 | End: 2019-08-04 | Stop reason: HOSPADM

## 2019-08-03 RX ORDER — LAMOTRIGINE 100 MG/1
200 TABLET ORAL DAILY
Status: DISCONTINUED | OUTPATIENT
Start: 2019-08-03 | End: 2019-08-04 | Stop reason: HOSPADM

## 2019-08-03 RX ORDER — IPRATROPIUM BROMIDE AND ALBUTEROL SULFATE 2.5; .5 MG/3ML; MG/3ML
3 SOLUTION RESPIRATORY (INHALATION) EVERY 4 HOURS PRN
Status: DISCONTINUED | OUTPATIENT
Start: 2019-08-03 | End: 2019-08-04 | Stop reason: HOSPADM

## 2019-08-03 RX ORDER — SODIUM CHLORIDE 0.9 % (FLUSH) 0.9 %
3-10 SYRINGE (ML) INJECTION AS NEEDED
Status: DISCONTINUED | OUTPATIENT
Start: 2019-08-03 | End: 2019-08-04 | Stop reason: HOSPADM

## 2019-08-03 RX ORDER — CYCLOBENZAPRINE HCL 5 MG
5 TABLET ORAL EVERY 8 HOURS SCHEDULED
Status: DISCONTINUED | OUTPATIENT
Start: 2019-08-03 | End: 2019-08-04 | Stop reason: HOSPADM

## 2019-08-03 RX ORDER — LORAZEPAM 1 MG/1
4 TABLET ORAL
Status: DISCONTINUED | OUTPATIENT
Start: 2019-08-03 | End: 2019-08-04 | Stop reason: HOSPADM

## 2019-08-03 RX ORDER — CLONAZEPAM 0.5 MG/1
0.5 TABLET ORAL 3 TIMES DAILY PRN
Status: DISCONTINUED | OUTPATIENT
Start: 2019-08-03 | End: 2019-08-03

## 2019-08-03 RX ORDER — ACETAMINOPHEN 325 MG/1
650 TABLET ORAL EVERY 4 HOURS PRN
Status: DISCONTINUED | OUTPATIENT
Start: 2019-08-03 | End: 2019-08-04 | Stop reason: HOSPADM

## 2019-08-03 RX ORDER — PAROXETINE HYDROCHLORIDE 20 MG/1
40 TABLET, FILM COATED ORAL DAILY
Status: DISCONTINUED | OUTPATIENT
Start: 2019-08-03 | End: 2019-08-04 | Stop reason: HOSPADM

## 2019-08-03 RX ORDER — NITROGLYCERIN 0.4 MG/1
0.4 TABLET SUBLINGUAL
Status: DISCONTINUED | OUTPATIENT
Start: 2019-08-03 | End: 2019-08-04 | Stop reason: HOSPADM

## 2019-08-03 RX ORDER — ONDANSETRON 2 MG/ML
4 INJECTION INTRAMUSCULAR; INTRAVENOUS EVERY 6 HOURS PRN
Status: DISCONTINUED | OUTPATIENT
Start: 2019-08-03 | End: 2019-08-04 | Stop reason: HOSPADM

## 2019-08-03 RX ORDER — LORAZEPAM 2 MG/ML
1 INJECTION INTRAMUSCULAR
Status: DISCONTINUED | OUTPATIENT
Start: 2019-08-03 | End: 2019-08-04 | Stop reason: HOSPADM

## 2019-08-03 RX ORDER — LEVOTHYROXINE SODIUM 0.12 MG/1
125 TABLET ORAL
Status: DISCONTINUED | OUTPATIENT
Start: 2019-08-04 | End: 2019-08-04 | Stop reason: HOSPADM

## 2019-08-03 RX ADMIN — PAROXETINE HYDROCHLORIDE 40 MG: 20 TABLET, FILM COATED ORAL at 20:01

## 2019-08-03 RX ADMIN — LAMOTRIGINE 200 MG: 100 TABLET ORAL at 20:01

## 2019-08-03 RX ADMIN — SODIUM CHLORIDE, PRESERVATIVE FREE 3 ML: 5 INJECTION INTRAVENOUS at 20:02

## 2019-08-03 RX ADMIN — OXYCODONE HYDROCHLORIDE AND ACETAMINOPHEN 1 TABLET: 5; 325 TABLET ORAL at 19:44

## 2019-08-03 RX ADMIN — CYCLOBENZAPRINE HYDROCHLORIDE 5 MG: 5 TABLET, FILM COATED ORAL at 21:00

## 2019-08-03 RX ADMIN — NICOTINE 1 PATCH: 21 PATCH, EXTENDED RELEASE TRANSDERMAL at 20:01

## 2019-08-03 NOTE — H&P
NEUROSURGERY INITIAL HOSPITAL ENCOUNTER    Assessment/Plan:   Judie Alba is a 53 y.o. female with a significant comorbidity anxiety, depression, bipolar 1, chronic alcoholism and hepatitis C.  She presents with a new problem of fall from standing height while intoxicated. Physical exam findings of ecchymosis around her right periorbital region, conjunctival hemorrhage, and bilateral Nguyen's with hyperreflexia but otherwise neurologically intact.  Their imaging shows 8 mm right acute subdural hematoma and's 2 small cerebral contusions.    Differential Diagnosis:   Subdural hematoma  TBI with cerebral contusions  Alcoholism  Bilateral hyperreflexia concerning for cervical myelopathy    Recommendations:  Judie has sustained a mild traumatic brain injury.  On the CRASH Head Injury Prognostic Score he has a predicted 14-day mortality of 5.5% and risk of unfavorable outcome at 6 months of 25.7% (http://[email or web address].[facility].ac.uk/Risk%20calculator/index.html).    - Admit to ICU  - Q1 hour neurological checks  - Call for change in GCS greater than 2 points.  - Repeat head CT in 6 hours.   - If GCS falls below 9, then candidate for ICP monitor, although evidence is equivocal  - Keppra 500 BID for seizure prophylaxis  - CIWA protocol  -Given her hyperreflexia I would like to obtain an MRI of her neck to rule out cervical stenosis.        I discussed the patients findings and my recommendations with patient, family and nursing staff    Thank you very much for this interesting consult.     Level of Risk: High due to:  severe exacerbation of chronic illness and illness with threat to life or bodily function  MDM: High Complexity  Mod = 42915, High=99223  ___________________________________________________________________    Reason for consult subdural hematoma    Chief Complaint: Headache    HPI: Judie Alba is a 53 y.o. female with a significant comorbidity anxiety, bipolar 1, alcoholism, and hepatitis C.  She was in  her normal state of health until last night approximately 1:00.  She was found by her  after falling.  She struck the right side of her face on his bedside table.  She is not aware of what the circumstances were of her fall but she does say she was intoxicated at the time.  The patient drinks approximately 48 beers per week.  She also has a history of hepatitis C.    Currently she complains of a headache predominantly on the right side with ecchymosis around the right eye and conjunctival hemorrhage.  She has no nausea or vomiting.  She is confused which is why her family triggered to an outside emergency room.  Noncontrast head CT was performed which showed a right subdural hematoma that she was transferred to [facility] for further evaluation by neurosurgery.  She denies weakness numbness or tingling in her arms.  She does have a chronic history of neck pain and sees a chiropractor.  She is able to ambulate but has significant dizziness.  She has no slurred speech.  She has no other syncopal episodes.  She does complain of some difficulty hearing this is in both sides.      Review of Systems   Constitutional: Negative.    HENT: Negative.    Eyes: Negative.    Respiratory: Negative.    Cardiovascular: Negative.    Gastrointestinal: Negative.    Endocrine: Negative.    Genitourinary: Negative.    Musculoskeletal: Negative.    Skin: Positive for wound.   Allergic/Immunologic: Negative.    Neurological: Positive for syncope.   Hematological: Negative.    Psychiatric/Behavioral: Negative.         Past Medical History:  has a past medical history of Anxiety, Arthritis, Asthma, Back pain, Bipolar 1 disorder (CMS/HCC), Chest pain, Decreased libido, Hepatitis C, HTN (hypertension), emphysema, Hypothyroid, and Stroke (CMS/HCC).    Past Surgical History:  has a past surgical history that includes Cholecystectomy; Dilation and curettage, diagnostic / therapeutic; Endometrial ablation; and Cardiac catheterization  (Left, 6/28/2017).    Family History: family history includes Alcohol abuse in her mother; Diabetes in her father and sister; Heart disease in her father, mother, and sister; Hyperlipidemia in her father and mother; Hypertension in her father and mother.    Social History:  reports that she has been smoking cigarettes.  She has a 80.00 pack-year smoking history. She has never used smokeless tobacco. She reports that she drinks alcohol. She reports that she does not use drugs.    Allergies: Codeine    Home Medications: No current facility-administered medications for this encounter.     Medications: Scheduled Meds:  Continuous Infusions:  No current facility-administered medications for this encounter.   PRN Meds:.    Vital Signs       Physical Exam  Physical Exam   Constitutional: She is oriented to person, place, and time. She appears well-developed and well-nourished.   HENT:   Head: Normocephalic and atraumatic.   Eyes: EOM are normal. Pupils are equal, round, and reactive to light.   Neck: Normal range of motion. Neck supple.   Pulmonary/Chest: Effort normal and breath sounds normal.   Abdominal: Soft.   Musculoskeletal: Normal range of motion.   Neurological: She is oriented to person, place, and time. She has a normal Finger-Nose-Finger Test, a normal Heel to Kearns Test and a normal Tandem Gait Test. Gait normal.   Reflex Scores:       Tricep reflexes are 3+ on the right side and 3+ on the left side.       Bicep reflexes are 3+ on the right side and 3+ on the left side.       Brachioradialis reflexes are 3+ on the right side and 3+ on the left side.       Patellar reflexes are 3+ on the right side and 3+ on the left side.  Skin: Skin is warm and dry.        Psychiatric: Her speech is normal.       Neurologic Exam     Mental Status   Oriented to person, place, and time.   Registration: recalls 3 of 3 objects. Recall at 5 minutes: recalls 3 of 3 objects.   Attention: normal. Concentration: normal.   Speech:  speech is normal   Knowledge: consistent with education.     Cranial Nerves     CN II   Visual acuity: normal    CN III, IV, VI   Pupils are equal, round, and reactive to light.  Extraocular motions are normal.   Diplopia: none    CN V   Facial sensation intact.   Right corneal reflex: normal  Left corneal reflex: normal    CN VII   Right facial weakness: none  Left facial weakness: none    CN VIII   Hearing: intact    CN IX, X   Palate: symmetric  Right gag reflex: normal  Left gag reflex: normal    CN XI   Right trapezius strength: normal  Left trapezius strength: normal    CN XII   Tongue deviation: none    Motor Exam   Right arm tone: normal  Left arm tone: normal  Right arm pronator drift: absent  Left arm pronator drift: absent  Right leg tone: normal  Left leg tone: normal    Strength   Strength 5/5 except as noted.     Sensory Exam   Light touch normal.   Proprioception normal.     Gait, Coordination, and Reflexes     Gait  Gait: normal    Coordination   Finger to nose coordination: normal  Heel to shin coordination: normal  Tandem walking coordination: normal    Reflexes   Reflexes 2+ except as noted.   Right brachioradialis: 3+  Left brachioradialis: 3+  Right biceps: 3+  Left biceps: 3+  Right triceps: 3+  Left triceps: 3+  Right patellar: 3+  Left patellar: 3+  Right plantar: normal  Left plantar: normal  Right Nguyen: present  Left Nguyen: present      Results Review:   Independent review and interpretation of imaging  Imaging Results (last 24 hours)     ** No results found for the last 24 hours. **        MRI brain:  MRI spine:   CT Head: Noncontrast head CT.  Evidence of acute right frontal subdural hematoma measuring 8 mm.  Only minimal sulcal effacement due to generalized cortical atrophy.  2 small areas of intracerebral contusion in the right frontal lobe.  No evidence of stroke.  No significant midline shift.        CT c-spine:  CT t-spine:  CT l-spine:  X-ray:    Outside hospital labs.    White  blood cell count 12.6 hemoglobin 16.1 platelets of 254, sodium 139, potassium 4.0, BUN 5, BUN/creatinine ratio 6.5, EGFR greater than 60, AST 46, ALT 59, INR 1.5.    I reviewed the patient's new clinical results.  Lab Results (last 24 hours)     ** No results found for the last 24 hours. **          Jose Amezquita MD

## 2019-08-04 ENCOUNTER — APPOINTMENT (OUTPATIENT)
Dept: MRI IMAGING | Facility: HOSPITAL | Age: 54
End: 2019-08-04

## 2019-08-04 ENCOUNTER — APPOINTMENT (OUTPATIENT)
Dept: CT IMAGING | Facility: HOSPITAL | Age: 54
End: 2019-08-04

## 2019-08-04 VITALS
OXYGEN SATURATION: 96 % | DIASTOLIC BLOOD PRESSURE: 88 MMHG | HEART RATE: 73 BPM | RESPIRATION RATE: 14 BRPM | WEIGHT: 188.2 LBS | SYSTOLIC BLOOD PRESSURE: 122 MMHG | TEMPERATURE: 98.2 F | BODY MASS INDEX: 28.52 KG/M2 | HEIGHT: 68 IN

## 2019-08-04 PROCEDURE — 94799 UNLISTED PULMONARY SVC/PX: CPT

## 2019-08-04 PROCEDURE — 94640 AIRWAY INHALATION TREATMENT: CPT

## 2019-08-04 PROCEDURE — 97162 PT EVAL MOD COMPLEX 30 MIN: CPT

## 2019-08-04 PROCEDURE — 99239 HOSP IP/OBS DSCHRG MGMT >30: CPT | Performed by: NEUROLOGICAL SURGERY

## 2019-08-04 PROCEDURE — 72141 MRI NECK SPINE W/O DYE: CPT

## 2019-08-04 PROCEDURE — 97165 OT EVAL LOW COMPLEX 30 MIN: CPT | Performed by: OCCUPATIONAL THERAPIST

## 2019-08-04 PROCEDURE — 70450 CT HEAD/BRAIN W/O DYE: CPT

## 2019-08-04 RX ORDER — HYDROCODONE BITARTRATE AND ACETAMINOPHEN 5; 325 MG/1; MG/1
1 TABLET ORAL EVERY 6 HOURS PRN
Qty: 10 TABLET | Refills: 0 | Status: SHIPPED | OUTPATIENT
Start: 2019-08-04 | End: 2019-08-06 | Stop reason: SDUPTHER

## 2019-08-04 RX ADMIN — OXYCODONE HYDROCHLORIDE AND ACETAMINOPHEN 1 TABLET: 5; 325 TABLET ORAL at 05:26

## 2019-08-04 RX ADMIN — OXYCODONE HYDROCHLORIDE AND ACETAMINOPHEN 1 TABLET: 5; 325 TABLET ORAL at 09:53

## 2019-08-04 RX ADMIN — BISOPROLOL FUMARATE: 5 TABLET ORAL at 08:37

## 2019-08-04 RX ADMIN — LEVOTHYROXINE SODIUM 125 MCG: 125 TABLET ORAL at 05:23

## 2019-08-04 RX ADMIN — SODIUM CHLORIDE, PRESERVATIVE FREE 3 ML: 5 INJECTION INTRAVENOUS at 08:37

## 2019-08-04 RX ADMIN — PAROXETINE HYDROCHLORIDE 40 MG: 20 TABLET, FILM COATED ORAL at 08:37

## 2019-08-04 RX ADMIN — CYCLOBENZAPRINE HYDROCHLORIDE 5 MG: 5 TABLET, FILM COATED ORAL at 05:23

## 2019-08-04 RX ADMIN — OXYCODONE HYDROCHLORIDE AND ACETAMINOPHEN 1 TABLET: 5; 325 TABLET ORAL at 00:02

## 2019-08-04 RX ADMIN — IPRATROPIUM BROMIDE AND ALBUTEROL SULFATE 3 ML: 2.5; .5 SOLUTION RESPIRATORY (INHALATION) at 08:41

## 2019-08-04 RX ADMIN — LAMOTRIGINE 200 MG: 100 TABLET ORAL at 08:37

## 2019-08-04 NOTE — PLAN OF CARE
Problem: Patient Care Overview  Goal: Plan of Care Review  Outcome: Ongoing (interventions implemented as appropriate)   08/04/19 1350   Coping/Psychosocial   Plan of Care Reviewed With patient;significant other   Plan of Care Review   Progress improving   OTHER   Outcome Summary OT eval completed. Pt alert and oriented x4. Pt reports HA 8/10. Pt was supervision to CGA x1 for functional mobility. Pt was supervision with set up to kalia socks. Skilled OT not warranted d/t independence level. MD notified pt of d/c just prior to OT/PT evaluation. Please reconsult if new needs or concerns arise. Recommend d/c home with assist.

## 2019-08-04 NOTE — THERAPY DISCHARGE NOTE
Acute Care - Occupational Therapy Initial Eval/Discharge  [facility]     Patient Name: Judie Alba  : 1965  MRN: [id number]  Today's Date: 2019  Onset of Illness/Injury or Date of Surgery: 19  Date of Referral to OT: 19  Referring Physician: Dr. Amezquita      Admit Date: 8/3/2019       ICD-10-CM ICD-9-CM   1. SDH (subdural hematoma) (CMS/HCC) S06.5X9A 432.1   2. Decreased mobility and endurance Z74.09 780.99     Patient Active Problem List   Diagnosis   • Chest pain   • HTN (hypertension)   • Smoker 2 PPD X 30 years   • Family history of early CAD   • Abnormal nuclear stress test Apical ischemia Francesca   • Bipolar 1 disorder (CMS/HCC)   • SDH (subdural hematoma) (CMS/HCC)     Past Medical History:   Diagnosis Date   • Anxiety    • Arthritis    • Asthma    • Back pain    • Bipolar 1 disorder (CMS/HCC)    • Chest pain    • Decreased libido    • Hepatitis C    • HTN (hypertension)    • Hx of emphysema    • Hypothyroid    • Stroke (CMS/HCC)      Past Surgical History:   Procedure Laterality Date   • CARDIAC CATHETERIZATION Left 2017    Procedure: Cardiac Catheterization/Vascular Study;  Surgeon: Tyrell Hutchinson MD;  Location: [facility] INVASIVE LOCATION;  Service:    • CHOLECYSTECTOMY     • DILATION AND CURETTAGE, DIAGNOSTIC / THERAPEUTIC     • ENDOMETRIAL ABLATION            OT ASSESSMENT FLOWSHEET (last 12 hours)      Occupational Therapy Evaluation     Row Name 19 1350                   OT Evaluation Time/Intention    Subjective Information  complains of;weakness;fatigue;pain;swelling  -MM        Document Type  discharge evaluation/summary  -MM        Mode of Treatment  occupational therapy  -MM        Patient Effort  good  -MM        Symptoms Noted During/After Treatment  none  -MM           General Information    Patient Profile Reviewed?  yes  -MM        Onset of Illness/Injury or Date of Surgery  19  -MM        Referring Physician  Dr. Amezquita  -MM        Patient  Observations  alert;cooperative;agree to therapy  -MM        Patient/Family Observations  s/o in chair  -MM        General Observations of Patient  fowlers in bed  -MM        Prior Level of Function  independent:;all household mobility;community mobility;gait;bathing;dressing;grooming;feeding  -MM        Equipment Currently Used at Home  none  -MM        Pertinent History of Current Functional Problem  fall and hit her head causing subdural hematoma, ecchymosis around periorbial region, conjuctional hemorrhage and bilateral Hoffmans w/ hyperreflexia. Cerebral contusions. PMH includes anxiety, depression, bipolar, alcoholism, hepatitis C.   -MM        Existing Precautions/Restrictions  fall  -MM        Risks Reviewed  patient:;nausea/vomiting;dizziness;LOB;increased discomfort;change in vital signs;increased drainage;lines disloged  -MM        Benefits Reviewed  patient:;improve function;increase independence;increase strength;increase balance;decrease pain;decrease risk of DVT;improve skin integrity;increase knowledge  -MM        Barriers to Rehab  none identified  -MM           Relationship/Environment    Primary Source of Support/Comfort  significant other  -MM        Lives With  significant other  -MM        Family Caregiver if Needed  significant other  -MM           Resource/Environmental Concerns    Current Living Arrangements  home/apartment/condo  -MM        Resource/Environmental Concerns  none  -MM           Stairs Within Home, Primary    Number of Stairs, Within Home, Primary  other (see comments) 20  -MM        Stair Railings, Within Home, Primary  railing on left side (ascending)  -MM           Cognitive Assessment/Interventions    Additional Documentation  Cognitive Assessment/Intervention (Group)  -MM           Cognitive Assessment/Intervention- PT/OT    Affect/Mental Status (Cognitive)  WFL  -MM        Orientation Status (Cognition)  oriented x 4  -MM        Follows Commands (Cognition)  WFL  -MM         Cognitive Function (Cognitive)  WFL  -MM        Personal Safety Interventions  fall prevention program maintained;gait belt;muscle strengthening facilitated;nonskid shoes/slippers when out of bed  -MM           Safety Issues, Functional Mobility    Safety Issues Affecting Function (Mobility)  at risk behavior observed;friction/shear risk;judgment  -MM        Impairments Affecting Function (Mobility)  balance;coordination;endurance/activity tolerance;pain;strength  -MM           Bed Mobility Assessment/Treatment    Bed Mobility Assessment/Treatment  supine-sit;sit-supine  -MM        Supine-Sit [place] (Bed Mobility)  supervision  -MM        Sit-Supine [place] (Bed Mobility)  supervision  -MM        Assistive Device (Bed Mobility)  bed rails;head of bed elevated  -MM           Functional Mobility    Functional Mobility- Ind. Level  contact guard assist;supervision required  -MM        Functional Mobility- Comment  in [place]de  -MM           Transfer Assessment/Treatment    Transfer Assessment/Treatment  sit-stand transfer;stand-sit transfer  -MM           Sit-Stand Transfer    Sit-Stand [place] (Transfers)  supervision  -MM           Stand-Sit Transfer    Stand-Sit [place] (Transfers)  supervision  -MM           ADL Assessment/Intervention    BADL Assessment/Intervention  lower body dressing  -MM           Lower Body Dressing Assessment/Training    Lower Body Dressing [place] Level  supervision;set up;don;socks  -MM        Lower Body Dressing Position  edge of bed sitting  -MM           BADL Safety/Performance    Impairments, BADL Safety/Performance  balance;endurance/activity tolerance  -MM           General ROM    GENERAL ROM COMMENTS  BUE WNL  -MM           MMT (Manual Muscle Testing)    General MMT Comments  BUE WNL  -MM           Motor Assessment/Interventions    Additional Documentation  Fine Motor Testing & Training (Group);Gross Motor Coordination (Group)  -MM           Gross Motor  Coordination    Gross Motor Skill, Impairments Detail  mild impairement BUE  -MM           Fine Motor Testing & Training    Comment, Fine Motor Coordination  BUE mild impairement  -MM           Sensory Assessment/Intervention    Sensory General Assessment  no sensation deficits identified  -MM           Positioning and Restraints    Pre-Treatment Position  in bed  -MM        Post Treatment Position  bed  -MM        In Bed  notified nsg;fowlers;call light within reach;encouraged to call for assist;with family/caregiver;side rails up x2  -MM           Pain Scale: Numbers Pre/Post-Treatment    Pain Scale: Numbers, Pretreatment  8/10  -MM        Pain Scale: Numbers, Post-Treatment  8/10  -MM        Pain Location  head  -MM        Pain Intervention(s)  Medication (See MAR);Repositioned;Ambulation/increased activity  -MM           Coping    Observed Emotional State  accepting;cooperative  -MM        Verbalized Emotional State  acceptance  -MM           Plan of Care Review    Plan of Care Reviewed With  patient;significant other  -MM           Clinical Impression (OT)    Date of Referral to OT  08/04/19  -MM        OT Diagnosis  impaired mobility and adls  -MM        Patient/Family Goals Statement (OT Eval)  return home  -MM        Criteria for Skilled Therapeutic Interventions Met (OT Eval)  no;no problems identified which require skilled intervention;current level of function same as previous level of function  -MM        Therapy Frequency (OT Eval)  evaluation only  -MM        Care Plan Review (OT)  evaluation/treatment results reviewed;care plan/treatment goals reviewed;risks/benefits reviewed;current/potential barriers reviewed;patient/other agree to care plan  -MM        Care Plan Review, Other Participant (OT Eval)  significant other  -MM        Anticipated Discharge Disposition (OT)  home with assist  -MM           Vital Signs    Pre Systolic BP Rehab  113  -MM        Pre Treatment Diastolic BP  73  -MM        Post  Systolic BP Rehab  126  -MM        Post Treatment Diastolic BP  88  -MM        Pretreatment Heart Rate (beats/min)  74  -MM        Posttreatment Heart Rate (beats/min)  77  -MM        Pretreatment Resp Rate (breaths/min)  17  -MM        Posttreatment Resp Rate (breaths/min)  14  -MM        Pre SpO2 (%)  94  -MM        Pre Patient Position  Supine  -MM        Intra Patient Position  Standing  -MM        Post Patient Position  Supine  -MM           Living Environment    Home Accessibility  wheelchair accessible;stairs within home;tub/shower is not walk in  -MM          User Key  (r) = Recorded By, (t) = Taken By, (c) = Cosigned By    Initials Name Effective Dates    MM John Ernst OTR/L 04/03/18 -           Occupational Therapy Education     Title: PT OT SLP Therapies (Resolved)     Topic: Occupational Therapy (Resolved)     Point: ADL training (Resolved)     Description: Instruct learner(s) on proper safety adaptation and remediation techniques during self care or transfers.   Instruct in proper use of assistive devices.    Learning Progress Summary           Patient Acceptance, E, VU by  at 8/4/2019  3:20 PM    Comment:  OT role, benefits, POC, d/c planning, home safety   Significant Other Acceptance, E, VU by  at 8/4/2019  3:20 PM    Comment:  OT role, benefits, POC, d/c planning, home safety                               User Key     Initials Effective Dates Name Provider Type Discipline     04/03/18 -  John Ernst OTR/L Occupational Therapist OT                OT Recommendation and Plan  Outcome Summary/Treatment Plan (OT)  Anticipated Discharge Disposition (OT): home with assist  Therapy Frequency (OT Eval): evaluation only  Plan of Care Review  Plan of Care Reviewed With: patient, significant other  Plan of Care Reviewed With: patient, significant other  Outcome Summary: OT eval completed. Pt alert and oriented x4. Pt reports HA 8/10. Pt was supervision to CGA x1 for functional mobility. Pt  was supervision with set up to kalia socks. Skilled OT not warranted d/t independence level. MD notified pt of d/c just prior to OT/PT evaluation. Please reconsult if new needs or concerns arise. Recommend d/c home with assist.         Outcome Measures     Row Name 08/04/19 1500 08/04/19 1016          How much help from another person do you currently need...    Turning from your back to your side while in flat bed without using bedrails?  --  4  -SB (r) SC (t) SB (c)     Moving from lying on back to sitting on the side of a flat bed without bedrails?  --  4  -SB (r) SC (t) SB (c)     Moving to and from a bed to a chair (including a wheelchair)?  --  4  -SB (r) SC (t) SB (c)     Standing up from a chair using your arms (e.g., wheelchair, bedside chair)?  --  4  -SB (r) SC (t) SB (c)     Climbing 3-5 steps with a railing?  --  3  -SB (r) SC (t) SB (c)     To walk in hospital room?  --  3  -SB (r) SC (t) SB (c)     AM-PAC 6 Clicks Score (PT)  --  22  -SB (r) SC (t)        How much help from another is currently needed...    Putting on and taking off regular lower body clothing?  4  -MM  --     Bathing (including washing, rinsing, and drying)  4  -MM  --     Toileting (which includes using toilet bed pan or urinal)  4  -MM  --     Putting on and taking off regular upper body clothing  4  -MM  --     Taking care of personal grooming (such as brushing teeth)  4  -MM  --     Eating meals  4  -MM  --     AM-PAC 6 Clicks Score (OT)  24  -MM  --        Functional Assessment    Outcome Measure Options  AM-PAC 6 Clicks Daily Activity (OT)  -MM  AM-PAC 6 Clicks Basic Mobility (PT)  -SB (r) SC (t) SB (c)       User Key  (r) = Recorded By, (t) = Taken By, (c) = Cosigned By    Initials Name Provider Type    MM John Ernst, OTR/L Occupational Therapist    Breanna Rocha, PT Physical Therapist    Ottoniel Grimes, PT Student PT Student          Time Calculation:   Time Calculation- OT     Row Name 08/04/19 1522              Time Calculation- OT    OT Start Time  1350 +7 min chart review  -MM      OT Stop Time  1424  -MM      OT Time Calculation (min)  34 min  -MM      OT Received On  08/04/19  -MM        User Key  (r) = Recorded By, (t) = Taken By, (c) = Cosigned By    Initials Name Provider Type    MM John Ernst, OTR/L Occupational Therapist        Therapy Suggested Charges     Code   Minutes Charges    None           Therapy Charges for Today     Code Description Service Date Service Provider Modifiers Qty    [id number]  OT EVAL LOW COMPLEXITY 3 8/4/2019 John Ernst, LIENR/L GO 1               OT Discharge Summary  Anticipated Discharge Disposition (OT): home with assist  Reason for Discharge: Independent, At baseline function  Outcomes Achieved: Refer to plan of care for updates on goals achieved  Discharge Destination: Home with assist    WALT Johnson/MATY  8/4/2019

## 2019-08-04 NOTE — THERAPY DISCHARGE NOTE
Acute Care - Physical Therapy Initial Eval/Discharge  [facility]     Patient Name: Judie Alba  : 1965  MRN: [id number]  Today's Date: 2019   Onset of Illness/Injury or Date of Surgery: (P) 19  Date of Referral to PT: 19  Referring Physician: MAGUI) Dr. Amezquita      Admit Date: 8/3/2019    Visit Dx:    ICD-10-CM ICD-9-CM   1. SDH (subdural hematoma) (CMS/HCC) S06.5X9A 432.1   2. Decreased mobility and endurance Z74.09 780.99     Patient Active Problem List   Diagnosis   • Chest pain   • HTN (hypertension)   • Smoker 2 PPD X 30 years   • Family history of early CAD   • Abnormal nuclear stress test Apical ischemia Francesca   • Bipolar 1 disorder (CMS/HCC)   • SDH (subdural hematoma) (CMS/HCC)     Past Medical History:   Diagnosis Date   • Anxiety    • Arthritis    • Asthma    • Back pain    • Bipolar 1 disorder (CMS/HCC)    • Chest pain    • Decreased libido    • Hepatitis C    • HTN (hypertension)    • Hx of emphysema    • Hypothyroid    • Stroke (CMS/HCC)      Past Surgical History:   Procedure Laterality Date   • CARDIAC CATHETERIZATION Left 2017    Procedure: Cardiac Catheterization/Vascular Study;  Surgeon: Tyrell Hutchinson MD;  Location: [facility] INVASIVE LOCATION;  Service:    • CHOLECYSTECTOMY     • DILATION AND CURETTAGE, DIAGNOSTIC / THERAPEUTIC     • ENDOMETRIAL ABLATION            PT ASSESSMENT (last 12 hours)      Physical Therapy Evaluation     Row Name 19 1016          PT Evaluation Time/Intention    Subjective Information  complains of;weakness;fatigue;pain;swelling  -SB (r) SC (t) SB (c)     Document Type  discharge evaluation/summary  -SB (r) SC (t) SB (c)     Mode of Treatment  physical therapy;other (see comments) SEE MAR   -SB (r) SC (t) SB (c)     Patient Effort  good  -SB (r) SC (t) SB (c)     Symptoms Noted During/After Treatment  none  -SB (r) SC (t) SB (c)     Row Name 19 1016          General Information    Patient Profile Reviewed?  yes  -SB (r) SC  (t) SB (c)     Onset of Illness/Injury or Date of Surgery  08/03/19  -SB (r) SC (t) SB (c)     Referring Physician  Dr. Amezquita   -SB (r) SC (t) SB (c)     Patient Observations  alert;cooperative;agree to therapy  -SB (r) SC (t) SB (c)     Patient/Family Observations  SO in chair  -SB (r) SC (t) SB (c)     General Observations of Patient  fowlers in bed   -SB (r) SC (t) SB (c)     Prior Level of Function  independent:;all household mobility;community mobility;gait;bathing;dressing;grooming;feeding  -SB (r) SC (t) SB (c)     Equipment Currently Used at Home  none  -SB (r) SC (t) SB (c)     Pertinent History of Current Functional Problem  fall and hit her head causing subdural hematoma, ecchymosis around periorbial region, conjuctional hemorrhage and bilateral Hoffmans w/ hyperreflexia. Cerebral contusions. PMH includes anxiety, depression, bipolar, alcoholism, hepatitis C.   -SB (r) SC (t) SB (c)     Existing Precautions/Restrictions  fall  -SB (r) SC (t) SB (c)     Risks Reviewed  patient:;nausea/vomiting;dizziness;LOB;increased discomfort;change in vital signs;increased drainage;lines disloged  -SB (r) SC (t) SB (c)     Benefits Reviewed  patient:;improve function;increase independence;increase strength;increase balance;decrease pain;decrease risk of DVT;improve skin integrity;increase knowledge  -SB (r) SC (t) SB (c)     Barriers to Rehab  none identified  -SB (r) SC (t) SB (c)     Row Name 08/04/19 1016          Relationship/Environment    Primary Source of Support/Comfort  significant other  -SB (r) SC (t) SB (c)     Lives With  significant other  -SB (r) SC (t) SB (c)     Family Caregiver if Needed  significant other  -SB (r) SC (t) SB (c)     Row Name 08/04/19 1016          Resource/Environmental Concerns    Current Living Arrangements  home/apartment/condo  -SB (r) SC (t) SB (c)     Resource/Environmental Concerns  none  -SB (r) SC (t) SB (c)     Row Name 08/04/19 1016          Living Environment    Living  Arrangements  house  -SB (r) SC (t) SB (c)     Home Accessibility  wheelchair accessible;stairs within home;tub/shower is not walk in  -SB (r) SC (t) SB (c)     Row Name 08/04/19 1016          Stairs Within Home, Primary    Number of Stairs, Within Home, Primary  other (see comments) 20  -SB (r) SC (t) SB (c)     Stair Railings, Within Home, Primary  railing on left side (ascending)  -SB (r) SC (t) SB (c)     Row Name 08/04/19 1016          Cognitive Assessment/Interventions    Additional Documentation  Cognitive Assessment/Intervention (Group)  -SB (r) SC (t) SB (c)     Row Name 08/04/19 1016          Cognitive Assessment/Intervention- PT/OT    Affect/Mental Status (Cognitive)  WFL  -SB (r) SC (t) SB (c)     Orientation Status (Cognition)  oriented x 4  -SB (r) SC (t) SB (c)     Follows Commands (Cognition)  WFL  -SB (r) SC (t) SB (c)     Cognitive Function (Cognitive)  WFL  -SB (r) SC (t) SB (c)     Personal Safety Interventions  fall prevention program maintained;gait belt;muscle strengthening facilitated;nonskid shoes/slippers when out of bed  -SB (r) SC (t) SB (c)     Row Name 08/04/19 1016          Safety Issues, Functional Mobility    Safety Issues Affecting Function (Mobility)  at risk behavior observed;friction/shear risk;judgment  -SB (r) SC (t) SB (c)     Impairments Affecting Function (Mobility)  balance;coordination;endurance/activity tolerance;pain;strength  -SB (r) SC (t) SB (c)     Row Name 08/04/19 1016          Bed Mobility Assessment/Treatment    Bed Mobility Assessment/Treatment  supine-sit;sit-supine  -SB (r) SC (t) SB (c)     Supine-Sit [place] (Bed Mobility)  supervision  -SB (r) SC (t) SB (c)     Sit-Supine [place] (Bed Mobility)  supervision  -SB (r) SC (t) SB (c)     Assistive Device (Bed Mobility)  bed rails;head of bed elevated  -SB (r) SC (t) SB (c)     Row Name 08/04/19 1016          Transfer Assessment/Treatment    Transfer Assessment/Treatment  sit-stand transfer;stand-sit  transfer  -SB (r) SC (t) SB (c)     Sit-Stand [place] (Transfers)  supervision  -SB (r) SC (t) SB (c)     Stand-Sit [place] (Transfers)  supervision  -SB (r) SC (t) SB (c)     Row Name 08/04/19 1016          Gait/Stairs Assessment/Training    Gait/Stairs Assessment/Training  gait/ambulation independence  -SB (r) SC (t) SB (c)     [place] Level (Gait)  contact guard;1 person assist  -SB (r) SC (t) SB (c)     Distance in Feet (Gait)  200  -SB (r) SC (t) SB (c)     Pattern (Gait)  step-to  -SB (r) SC (t) SB (c)     Deviations/Abnormal Patterns (Gait)  antalgic;base of support, wide  -SB (r) SC (t) SB (c)     Bilateral Gait Deviations  heel strike decreased;knee hyperextension;weight shift ability decreased  -SB (r) SC (t) SB (c)     Row Name 08/04/19 1016          General ROM    GENERAL ROM COMMENTS  BLE AROM WFL   -SB (r) SC (t) SB (c)     Row Name 08/04/19 1016          MMT (Manual Muscle Testing)    General MMT Comments  functionally 4/5 w/ mobility and gait training   -SB (r) SC (t) SB (c)     Row Name 08/04/19 1016          Motor Assessment/Intervention    Additional Documentation  Balance (Group)  -SB (r) SC (t) SB (c)     [facility] Name 08/04/19 1016          Balance    Balance  static sitting balance;static standing balance  -SB (r) SC (t) SB (c)     Row Name 08/04/19 1016          Static Sitting Balance    Level of [place] (Unsupported Sitting, Static Balance)  supervision  -SB (r) SC (t) SB (c)     Sitting Position (Unsupported Sitting, Static Balance)  sitting on edge of bed  -SB (r) SC (t) SB (c)     Time Able to Maintain Position (Unsupported Sitting, Static Balance)  more than 5 minutes  -SB (r) SC (t) SB (c)     Row Name 08/04/19 1016          Static Standing Balance    Level of [place] (Supported Standing, Static Balance)  contact guard assist  -SB (r) SC (t) SB (c)     Time Able to Maintain Position (Supported Standing, Static Balance)  more than 5 minutes  -SB (r) SC (t) SB (c)      Row Name 08/04/19 1016          Sensory Assessment/Intervention    Sensory General Assessment  no sensation deficits identified  -SB (r) SC (t) SB (c)     Row Name 08/04/19 1016          Pain Assessment    Additional Documentation  Pain Scale: Numbers Pre/Post-Treatment (Group)  -SB (r) SC (t) SB (c)     Row Name 08/04/19 1016          Pain Scale: Numbers Pre/Post-Treatment    Pain Scale: Numbers, Pretreatment  8/10  -SB (r) SC (t) SB (c)     Pain Scale: Numbers, Post-Treatment  8/10  -SB (r) SC (t) SB (c)     Pain Location  head  -SB (r) SC (t) SB (c)     Pain Intervention(s)  Medication (See MAR);Repositioned;Ambulation/increased activity  -SB (r) SC (t) SB (c)     Row Name 08/04/19 1016          Coping    Observed Emotional State  accepting;cooperative  -SB (r) SC (t) SB (c)     Verbalized Emotional State  acceptance  -SB (r) SC (t) SB (c)     Row Name 08/04/19 1016          Plan of Care Review    Plan of Care Reviewed With  patient;significant other  -SB (r) SC (t) SB (c)     Row Name 08/04/19 1016          Physical Therapy Clinical Impression    Date of Referral to PT  08/04/19  -SB (r) SC (t) SB (c)     PT Diagnosis (PT Clinical Impression)  decreased endurance, impaired mobility   -SB (r) SC (t) SB (c)     Prognosis (PT Clinical Impression)  good  -SB (r) SC (t) SB (c)     Functional Level at Time of Evaluation (PT Clinical Impression)  assist for OOB activity   -SB (r) SC (t) SB (c)     Patient/Family Goals Statement (PT Clinical Impression)  return home   -SB (r) SC (t) SB (c)     Criteria for Skilled Interventions Met (PT Clinical Impression)  current level of function same as previous level of function  -SB (r) SC (t) SB (c)     Care Plan Review (PT)  evaluation/treatment results reviewed;care plan/treatment goals reviewed;risks/benefits reviewed;current/potential barriers reviewed;patient/other agree to care plan  -SB (r) SC (t) SB (c)     Care Plan Review, Other Participant (PT Clinical Impression)   significant other  -SB (r) SC (t) SB (c)     Row Name 08/04/19 1016          Vital Signs    Pre Systolic BP Rehab  113  -SB (r) SC (t) SB (c)     Pre Treatment Diastolic BP  73  -SB (r) SC (t) SB (c)     Post Systolic BP Rehab  126  -SB (r) SC (t) SB (c)     Post Treatment Diastolic BP  88  -SB (r) SC (t) SB (c)     Pretreatment Heart Rate (beats/min)  74  -SB (r) SC (t) SB (c)     Posttreatment Heart Rate (beats/min)  77  -SB (r) SC (t) SB (c)     Pretreatment Resp Rate (breaths/min)  17  -SB (r) SC (t) SB (c)     Posttreatment Resp Rate (breaths/min)  14  -SB (r) SC (t) SB (c)     Pre SpO2 (%)  94  -SB (r) SC (t) SB (c)     Pre Patient Position  Supine  -SB (r) SC (t) SB (c)     Intra Patient Position  Standing  -SB (r) SC (t) SB (c)     Post Patient Position  Supine  -SB (r) SC (t) SB (c)     Row Name 08/04/19 1016          Positioning and Restraints    Pre-Treatment Position  in bed  -SB (r) SC (t) SB (c)     Post Treatment Position  bed  -SB (r) SC (t) SB (c)     In Bed  sitting EOB;call light within reach;encouraged to call for assist;patient within staff view;with family/caregiver;side rails up x2  -SB (r) SC (t) SB (c)     Row Name 08/04/19 1016          Physical Therapy Discharge Summary    Additional Documentation  Discharge Summary, PT Eval (Group)  -SB (r) SC (t) SB (c)     Row Name 08/04/19 1016          Discharge Summary, PT Eval    Reason for Discharge (PT Discharge Summary)  patient discharged from this facility  -SB (r) SC (t) SB (c)     Outcomes Achieved Upon Discharge (PT Discharge Summary)  evaluation only  -SB (r) SC (t) SB (c)     Transfer to Another Level of Care or Facility (PT Discharge Summary)  patient will continue therapy goals following discharge  -SB (r) SC (t) SB (c)       User Key  (r) = Recorded By, (t) = Taken By, (c) = Cosigned By    Initials Name Provider Type    Breanna Rocha, PT Physical Therapist    Ottoniel Grimes, ROBERT Student PT Student          Physical Therapy  Education     Title: PT OT SLP Therapies (Resolved)     Topic: Physical Therapy (Resolved)     Point: Mobility training (Resolved)     Learning Progress Summary           Patient Acceptance, E, VU by SC at 8/4/2019  2:35 PM    Comment:  pt undersyands mobility concerns at this time. Body mechanics and posture were addressed                   Point: Body mechanics (Resolved)     Learning Progress Summary           Patient Acceptance, E, VU by SC at 8/4/2019  2:35 PM    Comment:  pt undersyands mobility concerns at this time. Body mechanics and posture were addressed                   Point: Precautions (Resolved)     Learning Progress Summary           Patient Acceptance, E, VU by SC at 8/4/2019  2:35 PM    Comment:  pt undersyands mobility concerns at this time. Body mechanics and posture were addressed                               User Key     Initials Effective Dates Name Provider Type Discipline    SC 05/15/19 -  Ottoniel Arango, PT Student PT Student PT                PT Recommendation and Plan  Anticipated Discharge Disposition (PT): home, home with assist  Therapy Frequency (PT Clinical Impression): evaluation only  Outcome Summary/Treatment Plan (PT)  Anticipated Discharge Disposition (PT): home, home with assist  Reason for Discharge (PT Discharge Summary): patient discharged from this facility  Plan of Care Reviewed With: patient, significant other  Outcome Summary: PT eval complete. Pt A&Ox4. Pt was seen immediately after doctor in room. Dr. Amezquita discharging. Pt was able to manage bed mobility, t/f, and gait training w/ supervision/CGAx1. Pt had balance and endurance deficits but will not be picked up by PT for skilled intervention. Recommend home w/ assit at d/c from facility. Pt will be reevalled if necessary w/ order from doctor pending status change. Reconsult if necessary.     Outcome Measures     Row Name 08/04/19 1016             How much help from another person do you currently need...     Turning from your back to your side while in flat bed without using bedrails?  4  -SB (r) SC (t) SB (c)      Moving from lying on back to sitting on the side of a flat bed without bedrails?  4  -SB (r) SC (t) SB (c)      Moving to and from a bed to a chair (including a wheelchair)?  4  -SB (r) SC (t) SB (c)      Standing up from a chair using your arms (e.g., wheelchair, bedside chair)?  4  -SB (r) SC (t) SB (c)      Climbing 3-5 steps with a railing?  3  -SB (r) SC (t) SB (c)      To walk in hospital room?  3  -SB (r) SC (t) SB (c)      AM-PAC 6 Clicks Score (PT)  22  -SB (r) SC (t)         Functional Assessment    Outcome Measure Options  AM-PAC 6 Clicks Basic Mobility (PT)  -SB (r) SC (t) SB (c)        User Key  (r) = Recorded By, (t) = Taken By, (c) = Cosigned By    Initials Name Provider Type    Breanna Rocha PT Physical Therapist    Ottoniel Grimes, PT Student PT Student           Time Calculation:   PT Charges     Row Name 08/04/19 1436             Time Calculation    Start Time  1349  -SB (r) SC (t) SB (c)      Stop Time  1420 additional 17 minute chart review, nurse consult   -SB (r) SC (t) SB (c)      Time Calculation (min)  31 min  -SB (r) SC (t)      PT Received On  08/04/19  -SB (r) SC (t) SB (c)         Time Calculation- PT    Total Timed Code Minutes- PT  48 minute(s)  -SB (r) SC (t) SB (c)        User Key  (r) = Recorded By, (t) = Taken By, (c) = Cosigned By    Initials Name Provider Type    Breanna Rocha PT Physical Therapist    Ottoniel Grimes, PT Student PT Student        Therapy Charges for Today     Code Description Service Date Service Provider Modifiers Qty    [id number] HC PT EVAL MOD COMPLEXITY 3 8/4/2019 Ottoniel Arango, PT Student GP 1          PT G-Codes  Outcome Measure Options: AM-PAC 6 Clicks Basic Mobility (PT)  AM-PAC 6 Clicks Score (PT): 22    PT Discharge Summary  Anticipated Discharge Disposition (PT): home, home with assist    Ottoniel Arango,  PT Student  8/4/2019

## 2019-08-04 NOTE — DISCHARGE INSTRUCTIONS
[facility] Neurosurgery    Postoperative care following brain surgery  Dear Patient,  You recently were admitted to the hospital for SDH and alcoholism and fall from standing height and are now ready to go home. These written instructions are intended to help you to recover quickly.  • If you have ANY QUESTIONS about your condition prior to discharge please ask Dr. Amezquita. In particular, if you have concerns about going home discuss them now. We do not want you to go until you are completely comfortable leaving the hospital.   • If you have ANY QUESTIONS about your condition after you go home call your doctor. The number is [phone] which is answered 24 hours a day. During regular working hours a  will connect you to your doctor, one of his partners, or one of our nurses. At night or on weekends the answering service will connect you with the physicianon call. DO NOT HESITATE to call. We want to help you with any problems.     Seizures  Anyone who has brain injury has a small risk of seizures. Seizures may involve involuntary shaking of the arms and legs, loss of consciousness, loss of urinary or bowel control. They typically last a few minutes, then the patient returns to normal. Seizures are frightening to watch, but usually resolve without long-term problems. Your doctor will often attempt to prevent seizures after surgery by putting you on anti-seizure medicine like Dilantin or Keppra. Sometimes seizures occur even when these medicines are used  What to do if a seizure occurs:  • If a seizure occurs and the patient does not return to normal in 10 minutes, call your Dr. Amezquita or go to the local emergency room.   • If multiple seizures occur, call 911.     Neurological Deficit  Neurological deficits are problems with brain function like speech difficulty, weakness, numbness, imbalance, etc. These deficits may be present before or after brain injury. Prior to discharge Dr. Amezquita will  make sure that all treatment needed to help you recover from such deficits has been instituted. He will also make sure that these deficits are stable or improving. After you go home, if you think any of your brain problems are getting worse, not better, it may be a sign of bleeding, infection, or other problems. Call Dr. Amezquita. He will order tests and prescribe treatment as needed.    Deep vein thrombosis/ pulmonary embolus  Some patients who are admitted to the hospital develop blood clots in the veins of the legs. These are caused by decreased walking and laying in bed.  These clots can cause pain or swelling in the legs, or may cause no obvious problem. They can break free from the legs and travel to the lungs causing shortness of breath and/or chest pain. If you develop pain or swelling in your legs after surgery, call your doctor. If you develop breathing problems or chest pain after surgery, call 911.    Medication  It is important to take your medication EXACTLY as prescribed. Some patients are reluctant to take pain medication. It is perfectly fine to take pain medication for several weeks after injury. We want to eliminate pain whenever possible. Many pain medications can cause nausea (sick to your stomach), constipation (inability to poop), or itching. Nausea may be minimized by taking the medication with food. Constipation can be relieved by taking stool softeners and/ or laxatives that you can purchase over the counter as needed. Some medications, like steroids or seizure medications, should not be stopped abruptly. They need to be weaned off over time. For instructions on weaning schedules call your doctor. Sometimes patients develop an allergy to a medication that was started during hospitalization. Most frequently an allergy will cause an itchy rash. Call your doctor if you think you might be having an allergic reaction.    Hydrocephalus  Your brain manufactures about one quart of clear fluid  (called cerebrospinal fluid or CSF) every day. Normally this fluid is reabsorbed into the blood stream. After brain injury the flow of fluid and the reabsorption process may be impaired. This leads to a buildup of water on the brain that is called hydrocephalus. Hydrocephalus can cause headache, somnolence, difficulty thinking, imbalance and loss of urinary control. If you think that the patient may have hydrocephalus, call your doctor. She/He will order a brain scan. If it shows water buildup a simple operation called a shunt may be needed to fix the problem.    How to contact your doctor  The neurosurgeon, Dr. Amezquita, and her/his team did your surgery and, therefore, are likely to know more about your condition than any other physicians. We are immediately available to help you with any problems after surgery. Please call us for any concerns at the following numbers:   • Doctor’s office [phone] (answered 24 hours a day)   • [facility]'s  [phone] (alternative emergency number for on-call neurosurgeon)     Specific instructions related to your surgery    Diet: no restrictions, eat a heart healthy diet.   Activity: as tolerated, no heavy lifting or strenuous exercise for at least 2 weeks.  ?  Follow up:   You should call as soon as possible for follow up with Dr. Amezquita in the neurosurgery clinic ([phone]) in 6 weeks.

## 2019-08-04 NOTE — PROGRESS NOTES
Neurosurgery Daily Progress Note    Assessment:   Judie Alba is a 53 y.o. female with a significant comorbidity anxiety, depression, bipolar 1, chronic alcoholism and hepatitis C.  She presents with a new problem of fall from standing height while intoxicated. Physical exam findings of ecchymosis around her right periorbital region, conjunctival hemorrhage, and bilateral Nguyen's with hyperreflexia but otherwise neurologically intact.  Their imaging shows 8 mm right acute subdural hematoma and's 2 small cerebral contusions.     Differential Diagnosis:   Subdural hematoma  TBI with cerebral contusions  Alcoholism  Bilateral hyperreflexia concerning for cervical myelopathy    DDX:     SDH (subdural hematoma) (CMS/HCC)    Patient Active Problem List   Diagnosis   • Chest pain   • HTN (hypertension)   • Smoker 2 PPD X 30 years   • Family history of early CAD   • Abnormal nuclear stress test Apical ischemia Francesca   • Bipolar 1 disorder (CMS/HCC)   • SDH (subdural hematoma) (CMS/HCC)     Plan:   Neuro: Neurologically intact   Subdural hematoma: Stable on repeat head CT.  Change neuro checks to every 4 hours and transfer to floor   Cervical stenosis: C6/7 cervical stenosis on imaging.  Decompression outpatient setting.  CV: No acute issue  Pulm: No acute issue  : No acute issue  FEN: Tolerating oral intake  Endocrine: No acute issue  GI: No acute issue  ID: No acute issue  Heme:  DVT prophylaxis held for intracranial bleed, recommend SCDs  Pain: Well-tolerated  Psych: History of alcoholism currently on CIWA protocol.  Dispo: Transfer to home and anticipate home either later today or tomorrow.      Chief complaint:   Fall from standing height with right ecchymosis periorbital    Subjective  Doing well no complaints.    Temp:  [97.8 °F (36.6 °C)-98.7 °F (37.1 °C)] 98.7 °F (37.1 °C)  Heart Rate:  [64-99] 68  Resp:  [12-21] 15  BP: ()/(45-88) 105/59    Objective    Neurologic Exam     Mental Status   Oriented  to person, place, and time.   Speech: speech is normal   Level of consciousness: alert    Cranial Nerves     CN III, IV, VI   Pupils are equal, round, and reactive to light.  Extraocular motions are normal.     CN V   Facial sensation intact.     CN VII   Facial expression full, symmetric.     Motor Exam   Right arm pronator drift: absent  Left arm pronator drift: absent    Strength   Right deltoid: 5/5  Left deltoid: 5/5  Right biceps: 5/5  Left biceps: 5/5  Right triceps: 5/5  Left triceps: 5/5  Right iliopsoas: 5/5  Left iliopsoas: 5/5  Right quadriceps: 5/5  Left quadriceps: 5/5  Right gastroc: 5/5  Left gastroc: 5/5    Sensory Exam   Light touch normal.       Drains:      Imaging Results (last 24 hours)     Procedure Component Value Units Date/Time    MRI Cervical Spine Without Contrast [[id number]] Collected:  08/04/19 0952     Updated:  08/04/19 0959    Narrative:       EXAMINATION:   MRI CERVICAL SPINE WO CONTRAST-  8/4/2019 9:52 AM CDT     HISTORY: MRI CERVICAL SPINE WO CONTRAST- 8/4/2019 9:05 AM CDT     HISTORY: cerv stenosis     COMPARISON: None      TECHNIQUE: Multiplanar, multisequence MRI of the cervical spine was  obtained without contrast.      FINDINGS:   Imaged portions of the cerebellum and brainstem are unremarkable.      Alignment: Normal cervical lordosis is maintained. There is no evidence  of listhesis or subluxation.      Marrow signal: No pathologic marrow infiltrate is demonstrated. The  vertebral body heights and posterior elements are maintained.      Cord/Canal: The spinal cord is normal in signal and morphology.      Soft tissues: The surrounding soft tissues are unremarkable.      Levels:   C2-C3: No disc bulge is present. No significant neuroforaminal or  central canal stenosis is seen.      C3-C4: Broad-based disc hypertrophic osteophyte complex is present this  compromises the right and left neural foramen..      C4-C5: No disc bulge is present. No significant neuroforaminal  or  central canal stenosis is seen.      C5-C6: Mild uncinate spurring is noted on the right. The left neural  foramen is patent..      C6-C7: There is flattening of the cord. Posterior facet joint  hypertrophy and a broad-based disc. There is uncinate spurring  compromising the left intervertebral neural foramen and encroaching on  the right neural foramen C6-7 level. Central canal stenosis is present  with an AP diameter of 7 mm..      C7-T1: No disc bulge is present. No significant neuroforaminal or  central canal stenosis is seen.        Impression:       1. Degenerative spondylosis is noted at described above        This report was finalized on 08/04/2019 09:55 by Dr. Reji Hernandez MD.    CT Head Without Contrast [[id number]] Collected:  08/04/19 0701     Updated:  08/04/19 0706    Narrative:       EXAMINATION:   CT HEAD WO CONTRAST-  8/4/2019 7:01 AM CDT     HISTORY: CT BRAIN without contrast 8/4/2019 12:10 AM CDT     HISTORY: Subdural hematoma     COMPARISON: None      DLP: 640 mGy cm     TECHNIQUE: Serial axial tomographic images of the brain were obtained  without the use of intravenous contrast.      FINDINGS:   The midline structures are nondisplaced. There is mild cerebral and  cerebellar volume loss, with an associated increase in the prominence of  the ventricles and sulci. The basilar cisterns are normal in size and  configuration. There is no evidence of intracranial hemorrhage or  mass-effect. A right frontal extra-axial subdural hematoma present. This  is approximately 6 mm. There is no associated midline shift. There is a  subtle subarachnoid component in the right frontal lobe.. There is no  evidence of tonsillar herniation.      The included orbits and their contents are unremarkable. The visualized  paranasal sinuses, mastoid air cells and middle ear cavities are clear.  The visualized osseous structures and overlying soft tissues of the  skull and face are intact.        Impression:       6 mm  right frontal subdural hematoma. There is no associated mass effect  or midline shift        This report was finalized on 08/04/2019 07:03 by Dr. Reji Hernandez MD.        Lab Results (last 24 hours)     Procedure Component Value Units Date/Time    Pregnancy, Urine - Urine, Clean Catch [[id number]]  (Normal) Collected:  08/03/19 2055    Specimen:  Urine, Clean Catch Updated:  08/03/19 2106     HCG, Urine QL Negative    Comprehensive Metabolic Panel [[id number]] Collected:  08/03/19 1943    Specimen:  Blood from Arm, Right Updated:  08/03/19 1956     Glucose 92 mg/dL      BUN 5 mg/dL      Creatinine 0.56 mg/dL      Sodium 140 mmol/L      Potassium 4.3 mmol/L      Chloride 105 mmol/L      CO2 28.0 mmol/L      Calcium 8.9 mg/dL      Total Protein 7.3 g/dL      Albumin 3.90 g/dL      ALT (SGPT) 49 U/L      AST (SGOT) 45 U/L      Alkaline Phosphatase 60 U/L      Total Bilirubin 0.5 mg/dL      eGFR Non African Amer 113 mL/min/1.73      Globulin 3.4 gm/dL      A/G Ratio 1.1 g/dL      BUN/Creatinine Ratio 8.9     Anion Gap 7.0 mmol/L     Narrative:       GFR Normal >60  Chronic Kidney Disease <60  Kidney Failure <15    CBC (No Diff) [[id number]]  (Abnormal) Collected:  08/03/19 1942    Specimen:  Blood Updated:  08/03/19 1947     WBC 9.78 10*3/mm3      RBC 4.43 10*6/mm3      Hemoglobin 14.4 g/dL      Hematocrit 42.6 %      MCV 96.2 fL      MCH 32.5 pg      MCHC 33.8 g/dL      RDW 13.2 %      RDW-SD 47.2 fl      MPV 8.7 fL      Platelets 197 10*3/mm3           [id number]  Jose Amezquita MD

## 2019-08-04 NOTE — PLAN OF CARE
Problem: Patient Care Overview  Goal: Plan of Care Review  Outcome: Ongoing (interventions implemented as appropriate)   08/04/19 1852   Coping/Psychosocial   Plan of Care Reviewed With patient;spouse;family;sibling   Plan of Care Review   Progress improving   OTHER   Outcome Summary Pt alert and oriented x4 throughout entirety of night. PERRLA. VSS. HR NSR 60-90. -120. On room air. C/O pain on right side of face, percocet given x2 with relief. Pt states that she is feeling much better and her eye swelling has decreased. Afebrile. Urine output good. No BM. Will continue to monitor.        Problem: Fall Risk (Adult)  Goal: Absence of Fall  Outcome: Ongoing (interventions implemented as appropriate)    Goal: Identify Related Risk Factors and Signs and Symptoms  Outcome: Ongoing (interventions implemented as appropriate)    Goal: Absence of Fall  Outcome: Ongoing (interventions implemented as appropriate)      Problem: Alcohol Withdrawal Acute, Risk/Actual (Adult)  Goal: Signs and Symptoms of Listed Potential Problems Will be Absent, Minimized or Managed (Alcohol Withdrawal Acute, Risk/Actual)  Outcome: Ongoing (interventions implemented as appropriate)      Problem: Skin Injury Risk (Adult)  Goal: Identify Related Risk Factors and Signs and Symptoms  Outcome: Ongoing (interventions implemented as appropriate)

## 2019-08-04 NOTE — DISCHARGE SUMMARY
Date of Discharge:  8/4/2019    Discharge Diagnosis:   Patient Active Problem List   Diagnosis   • Chest pain   • HTN (hypertension)   • Smoker 2 PPD X 30 years   • Family history of early CAD   • Abnormal nuclear stress test Apical ischemia Francesca   • Bipolar 1 disorder (CMS/HCC)   • SDH (subdural hematoma) (CMS/HCC)     1. SDH (subdural hematoma) (CMS/HCC)        Presenting Problem/History of Present Illness  SDH (subdural hematoma) (CMS/HCC) [S06.5X9A]   1. SDH (subdural hematoma) (CMS/HCC)        Hospital Course  Judie Alba is a 53 y.o. female with a significant comorbidity anxiety, depression, bipolar 1, chronic alcoholism and hepatitis C.  She presents with a new problem of fall from standing height while intoxicated. Physical exam findings of ecchymosis around her right periorbital region, conjunctival hemorrhage, and bilateral Nguyen's with hyperreflexia but otherwise neurologically intact.  Their imaging shows 8 mm right acute subdural hematoma and's 2 small cerebral contusions.    She is done well since admission.  Repeat head CT showed stability of her right subdural hematoma.  She is remained neurologically intact.  Her right eye swelling has gone down significantly.  We elected not to put her on Keppra as she is already on Lamictal.  This is for bipolar 1 and mood stabilization.  I would like to see her in 6 weeks with a noncontrast head CT.    Regarding her hyperreflexia a noncontrast MRI of the cervical spine was performed which showed a C6/7 moderate stenosis.  We discussed this condition and reviewed her films with her.  I would like to see her in approximately 6 weeks to discuss elective surgical intervention.    Given her dizziness and gait difficulties we will prescribe for outpatient ambulatory physical therapy and occupational therapy.  I have advised her to greatly curtail if not discontinue her drinking.      Procedures Performed         Consults:   Consults     No orders found for  last 30 day(s).          Pertinent Test Results:   Imaging Results (last 24 hours)     Procedure Component Value Units Date/Time    MRI Cervical Spine Without Contrast [[id number]] Collected:  08/04/19 0952     Updated:  08/04/19 0959    Narrative:       EXAMINATION:   MRI CERVICAL SPINE WO CONTRAST-  8/4/2019 9:52 AM CDT     HISTORY: MRI CERVICAL SPINE WO CONTRAST- 8/4/2019 9:05 AM CDT     HISTORY: cerv stenosis     COMPARISON: None      TECHNIQUE: Multiplanar, multisequence MRI of the cervical spine was  obtained without contrast.      FINDINGS:   Imaged portions of the cerebellum and brainstem are unremarkable.      Alignment: Normal cervical lordosis is maintained. There is no evidence  of listhesis or subluxation.      Marrow signal: No pathologic marrow infiltrate is demonstrated. The  vertebral body heights and posterior elements are maintained.      Cord/Canal: The spinal cord is normal in signal and morphology.      Soft tissues: The surrounding soft tissues are unremarkable.      Levels:   C2-C3: No disc bulge is present. No significant neuroforaminal or  central canal stenosis is seen.      C3-C4: Broad-based disc hypertrophic osteophyte complex is present this  compromises the right and left neural foramen..      C4-C5: No disc bulge is present. No significant neuroforaminal or  central canal stenosis is seen.      C5-C6: Mild uncinate spurring is noted on the right. The left neural  foramen is patent..      C6-C7: There is flattening of the cord. Posterior facet joint  hypertrophy and a broad-based disc. There is uncinate spurring  compromising the left intervertebral neural foramen and encroaching on  the right neural foramen C6-7 level. Central canal stenosis is present  with an AP diameter of 7 mm..      C7-T1: No disc bulge is present. No significant neuroforaminal or  central canal stenosis is seen.        Impression:       1. Degenerative spondylosis is noted at described above        This report  was finalized on 08/04/2019 09:55 by Dr. Reji Hernandez MD.    CT Head Without Contrast [[id number]] Collected:  08/04/19 0701     Updated:  08/04/19 0706    Narrative:       EXAMINATION:   CT HEAD WO CONTRAST-  8/4/2019 7:01 AM CDT     HISTORY: CT BRAIN without contrast 8/4/2019 12:10 AM CDT     HISTORY: Subdural hematoma     COMPARISON: None      DLP: 640 mGy cm     TECHNIQUE: Serial axial tomographic images of the brain were obtained  without the use of intravenous contrast.      FINDINGS:   The midline structures are nondisplaced. There is mild cerebral and  cerebellar volume loss, with an associated increase in the prominence of  the ventricles and sulci. The basilar cisterns are normal in size and  configuration. There is no evidence of intracranial hemorrhage or  mass-effect. A right frontal extra-axial subdural hematoma present. This  is approximately 6 mm. There is no associated midline shift. There is a  subtle subarachnoid component in the right frontal lobe.. There is no  evidence of tonsillar herniation.      The included orbits and their contents are unremarkable. The visualized  paranasal sinuses, mastoid air cells and middle ear cavities are clear.  The visualized osseous structures and overlying soft tissues of the  skull and face are intact.        Impression:       6 mm right frontal subdural hematoma. There is no associated mass effect  or midline shift        This report was finalized on 08/04/2019 07:03 by Dr. Reji Hernandez MD.        Lab Results (last 24 hours)     Procedure Component Value Units Date/Time    Pregnancy, Urine - Urine, Clean Catch [[id number]]  (Normal) Collected:  08/03/19 2055    Specimen:  Urine, Clean Catch Updated:  08/03/19 2106     HCG, Urine QL Negative    Comprehensive Metabolic Panel [[id number]] Collected:  08/03/19 1943    Specimen:  Blood from Arm, Right Updated:  08/03/19 1956     Glucose 92 mg/dL      BUN 5 mg/dL      Creatinine 0.56 mg/dL      Sodium 140 mmol/L       Potassium 4.3 mmol/L      Chloride 105 mmol/L      CO2 28.0 mmol/L      Calcium 8.9 mg/dL      Total Protein 7.3 g/dL      Albumin 3.90 g/dL      ALT (SGPT) 49 U/L      AST (SGOT) 45 U/L      Alkaline Phosphatase 60 U/L      Total Bilirubin 0.5 mg/dL      eGFR Non African Amer 113 mL/min/1.73      Globulin 3.4 gm/dL      A/G Ratio 1.1 g/dL      BUN/Creatinine Ratio 8.9     Anion Gap 7.0 mmol/L     Narrative:       GFR Normal >60  Chronic Kidney Disease <60  Kidney Failure <15    CBC (No Diff) [[id number]]  (Abnormal) Collected:  08/03/19 1942    Specimen:  Blood Updated:  08/03/19 1947     WBC 9.78 10*3/mm3      RBC 4.43 10*6/mm3      Hemoglobin 14.4 g/dL      Hematocrit 42.6 %      MCV 96.2 fL      MCH 32.5 pg      MCHC 33.8 g/dL      RDW 13.2 %      RDW-SD 47.2 fl      MPV 8.7 fL      Platelets 197 10*3/mm3           Condition on Discharge: Stable    Vital Signs  Temp:  [97.8 °F (36.6 °C)-98.7 °F (37.1 °C)] 98.7 °F (37.1 °C)  Heart Rate:  [64-99] 68  Resp:  [12-21] 15  BP: ()/(45-88) 105/59    Physical Exam:   Physical Exam   Constitutional: She is oriented to person, place, and time.   Eyes: EOM are normal. Pupils are equal, round, and reactive to light.   Neurological: She is oriented to person, place, and time.   Reflex Scores:       Tricep reflexes are 3+ on the right side and 3+ on the left side.       Bicep reflexes are 3+ on the right side and 3+ on the left side.       Brachioradialis reflexes are 3+ on the right side and 3+ on the left side.       Patellar reflexes are 3+ on the right side and 3+ on the left side.  Psychiatric: Her speech is normal.        Neurologic Exam     Mental Status   Oriented to person, place, and time.   Speech: speech is normal   Level of consciousness: alert    Cranial Nerves     CN III, IV, VI   Pupils are equal, round, and reactive to light.  Extraocular motions are normal.     CN V   Facial sensation intact.     CN VII   Facial expression full, symmetric.     Motor  Exam   Right arm pronator drift: absent  Left arm pronator drift: absent    Strength   Right deltoid: 5/5  Left deltoid: 5/5  Right biceps: 5/5  Left biceps: 5/5  Right triceps: 5/5  Left triceps: 5/5  Right iliopsoas: 5/5  Left iliopsoas: 5/5  Right quadriceps: 5/5  Left quadriceps: 5/5  Right gastroc: 5/5  Left gastroc: 5/5    Sensory Exam   Light touch normal.     Gait, Coordination, and Reflexes     Reflexes   Right brachioradialis: 3+  Left brachioradialis: 3+  Right biceps: 3+  Left biceps: 3+  Right triceps: 3+  Left triceps: 3+  Right patellar: 3+  Left patellar: 3+  Right Nguyen: present  Left Nguyen: present      Discharge Disposition  Home or Self Care    Discharge Medications     Discharge Medications      New Medications      Instructions Start Date   HYDROcodone-acetaminophen 5-325 MG per tablet  Commonly known as:  NORCO   1 tablet, Oral, Every 6 Hours PRN         Continue These Medications      Instructions Start Date   bisoprolol-hydrochlorothiazide 5-6.25 MG per tablet  Commonly known as:  ZIAC   1 tablet, Oral, Daily      clonazePAM 2 MG tablet  Commonly known as:  KlonoPIN   1 tablet, Oral, 3 Times Daily PRN      COMBIVENT RESPIMAT  MCG/ACT inhaler  Generic drug:  ipratropium-albuterol   INHALE 1 PUFF PO QID      cyclobenzaprine 5 MG tablet  Commonly known as:  FLEXERIL   1 tablet, Oral, Every 8 Hours Scheduled      lamoTRIgine  MG tablet sustained-release 24 hour   1 tablet, Oral, Nightly      levothyroxine 125 MCG tablet  Commonly known as:  SYNTHROID, LEVOTHROID   125 mcg, Oral, Daily      nitroglycerin 0.4 MG SL tablet  Commonly known as:  NITROSTAT   TK 1 T PO UNDER TONGUE Q 5 MIN UP TO 3 TABS PRN      NON FORMULARY   Tumeric      NON FORMULARY   Barbara Force       PARoxetine 40 MG tablet  Commonly known as:  PAXIL   Oral, Daily      Unable to find   1 each, Oral, 3 Times Daily, Liver care              Discharge Diet:  Regular    Activity at Discharge:  As  tolerated    Follow-up Appointments  Future Appointments   Date Time Provider Department Center   8/12/2019  1:30 PM  [facility]ULM LAB ROOM 1  [facility]   9/17/2019  1:00 PM Nilesh Costa,  [facility] None     Additional Instructions for the Follow-ups that You Need to Schedule     Ambulatory Referral to Occupational Therapy   As directed      Ambulatory Referral to Physical Therapy   As directed            Test Results Pending at Discharge       Jose Amezquita MD  08/04/19  2:08 PM    Time: Discharge 45 min

## 2019-08-04 NOTE — PLAN OF CARE
Problem: Patient Care Overview  Goal: Plan of Care Review   08/04/19 1016   Coping/Psychosocial   Plan of Care Reviewed With patient;significant other   OTHER   Outcome Summary PT eval complete. Pt A&Ox4. Pt was seen immediately after doctor in room. Dr. Amezquita discharging. Pt was able to manage bed mobility, t/f, and gait training w/ supervision/CGAx1. Pt had balance and endurance deficits but will not be picked up by PT for skilled intervention. Recommend home w/ assit at d/c from facility. Pt will be reevalled if necessary w/ order from doctor pending status change. Reconsult if necessary.

## 2019-08-05 NOTE — THERAPY DISCHARGE NOTE
Acute Care - Physical Therapy Discharge Summary  [facility]       Patient Name: Judie Alba  : 1965  MRN: [id number]    Today's Date: 2019  Onset of Illness/Injury or Date of Surgery: 19    Date of Referral to PT: 19  Referring Physician: Dr. Amezquita      Admit Date: 8/3/2019      PT Recommendation and Plan    Visit Dx:    ICD-10-CM ICD-9-CM   1. SDH (subdural hematoma) (CMS/[facility]) S06.5X9A 432.1   2. Decreased mobility and endurance Z74.09 780.99       Outcome Measures     Row Name 19 1500 19 1016          How much help from another person do you currently need...    Turning from your back to your side while in flat bed without using bedrails?  --  4  -SB (r) SC (t) SB (c)     Moving from lying on back to sitting on the side of a flat bed without bedrails?  --  4  -SB (r) SC (t) SB (c)     Moving to and from a bed to a chair (including a wheelchair)?  --  4  -SB (r) SC (t) SB (c)     Standing up from a chair using your arms (e.g., wheelchair, bedside chair)?  --  4  -SB (r) SC (t) SB (c)     Climbing 3-5 steps with a railing?  --  3  -SB (r) SC (t) SB (c)     To walk in hospital room?  --  3  -SB (r) SC (t) SB (c)     AM-PAC 6 Clicks Score (PT)  --  22  -SB (r) SC (t)        How much help from another is currently needed...    Putting on and taking off regular lower body clothing?  4  -MM  --     Bathing (including washing, rinsing, and drying)  4  -MM  --     Toileting (which includes using toilet bed pan or urinal)  4  -MM  --     Putting on and taking off regular upper body clothing  4  -MM  --     Taking care of personal grooming (such as brushing teeth)  4  -MM  --     Eating meals  4  -MM  --     AM-PAC 6 Clicks Score (OT)  24  -MM  --        Functional Assessment    Outcome Measure Options  AM-PAC 6 Clicks Daily Activity (OT)  -MM  AM-PAC 6 Clicks Basic Mobility (PT)  -SB (r) SC (t) SB (c)       User Key  (r) = Recorded By, (t) = Taken By, (c) = Cosigned By     Initials Name Provider Type    MM John Ernst, OTR/L Occupational Therapist    Breanna Rocha, PT Physical Therapist    Ottoniel Grimes, PT Student PT Student                      PT Discharge Summary  Anticipated Discharge Disposition (PT): home with assist  Reason for Discharge: Discharge from facility  Outcomes Achieved: Other(one time eval)  Discharge Destination: Home with assist      Connie Santana, PTA   8/5/2019

## 2019-08-05 NOTE — PROGRESS NOTES
CC:   Chief Complaint   Patient presents with   • Establish Care     Patient reports ear pain that she feels is causing balance issues. Still grieving losing her Father, cycling with bipolar   • Balance Issues   • Urinary Tract Infection       History:  Judie Alba is a 53 y.o. female who presents today for evaluation of the above problems.      History of bipolar I disorder with FLOYD review revealing last 2 clonazepam 2 mg refills for 30-day supply receiving 90 pills on 2019 and 2019. Says her PCP took her off of her lamictal and tried seroquel, says that she did not respond well, called police on fiancee claiming that he had ETOH in the car. Father  1.5 yrs ago and her mood has struggled since. Has been on Lamictal for 20 yrs, resumed paxil for about one year, and after reviewing controlled substance monitoring in 2018 she had to have a larger amount of Xanax, and then later required treatment with clonazepam for worsened anxiety, switched over to clonazepam at the end of April. Not seeing psychiatry.  She says that when she could not tolerate the Seroquel her sister who has long-standing mental health disease takes Lamictal and she brought some of hers, when she told her old PCP this she was terminated from their office.  She says that she has been out of clonazepam for at least 2 days    She has been noticing recently that her depression is worse when she gets home, and she has been occasionally been forgetful.  She denies a history of suzy symptoms, however she will occasionally hear background noise thinking that the TV is on when it can be unplugged as an auditory hallucination.    She says that she has balance issues with decreased hearing in her right ear as well as nasal congestion and a history of allergies.    She is concerned that she may have a urinary tract infection, and would like her urine test.  Denies dysuria or flank pain.    ROS:  Review of Systems   HENT:  Positive for congestion and hearing loss.    Respiratory: Shortness of breath: at times.    Genitourinary: Negative for dysuria and flank pain.   Psychiatric/Behavioral: Positive for agitation, behavioral problems, confusion, decreased concentration and dysphoric mood. Negative for suicidal ideas. Hallucinations: auditory at times, see hpi. The patient is nervous/anxious and is hyperactive.    All other systems reviewed and are negative.      Allergies   Allergen Reactions   • Codeine Itching     Past Medical History:   Diagnosis Date   • Anxiety    • Arthritis    • Asthma    • Back pain    • Bipolar 1 disorder (CMS/HCC)    • Chest pain    • Decreased libido    • Hepatitis C    • HTN (hypertension)    • Hx of emphysema    • Hypothyroid    • Stroke (CMS/HCC)      Past Surgical History:   Procedure Laterality Date   • CARDIAC CATHETERIZATION Left 6/28/2017    Procedure: Cardiac Catheterization/Vascular Study;  Surgeon: Tyrell Hutchinson MD;  Location: [facility] CATH INVASIVE LOCATION;  Service:    • CHOLECYSTECTOMY     • DILATION AND CURETTAGE, DIAGNOSTIC / THERAPEUTIC     • ENDOMETRIAL ABLATION       Family History   Problem Relation Age of Onset   • Heart disease Mother    • Hyperlipidemia Mother    • Hypertension Mother    • Alcohol abuse Mother    • Heart disease Father    • Hyperlipidemia Father    • Hypertension Father    • Diabetes Father    • Diabetes Sister    • Heart disease Sister       reports that she has been smoking cigarettes.  She has a 80.00 pack-year smoking history. She has never used smokeless tobacco. She reports that she drinks alcohol. She reports that she does not use drugs.      Current Outpatient Medications:   •  bisoprolol-hydrochlorothiazide (ZIAC) 5-6.25 MG per tablet, Take 1 tablet by mouth Daily., Disp: , Rfl: 3  •  CALCIUM & MAGNESIUM CARBONATES PO, Take 2 tablets by mouth Daily., Disp: , Rfl:   •  clonazePAM (KlonoPIN) 2 MG tablet, Take 1 tablet by mouth 3 (Three) Times a Day As Needed.,  "Disp: , Rfl: 0  •  COMBIVENT RESPIMAT  MCG/ACT inhaler, INHALE 1 PUFF PO QID, Disp: , Rfl: 11  •  cyclobenzaprine (FLEXERIL) 5 MG tablet, Take 1 tablet by mouth Every 8 (Eight) Hours., Disp: , Rfl:   •  levothyroxine (SYNTHROID, LEVOTHROID) 125 MCG tablet, Take 125 mcg by mouth Daily., Disp: , Rfl:   •  NON FORMULARY, Tumeric, Disp: , Rfl:   •  NON FORMULARY, Barbara Force, Disp: , Rfl:   •  PARoxetine (PAXIL) 40 MG tablet, Take  by mouth Daily., Disp: , Rfl: 5  •  Unable to find, Take 1 each by mouth 3 (Three) Times a Day. Liver care, Disp: , Rfl:   •  lamoTRIgine  MG tablet sustained-release 24 hour, Take 1 tablet by mouth Every Night., Disp: , Rfl: 5  •  nitroglycerin (NITROSTAT) 0.4 MG SL tablet, TK 1 T PO UNDER TONGUE Q 5 MIN UP TO 3 TABS PRN, Disp: , Rfl: 0    OBJECTIVE:  /80 (BP Location: Left arm, Patient Position: Sitting, Cuff Size: Adult)   Pulse 76   Temp 97.6 °F (36.4 °C) (Temporal)   Resp 16   Ht 172.7 cm (68\")   Wt 86.2 kg (190 lb 2 oz)   SpO2 96%   BMI 28.91 kg/m²      Physical Exam   Constitutional: She is oriented to person, place, and time. No distress.   HENT:   Head: Normocephalic and atraumatic.   Nose: Nose normal.   Eyes: Conjunctivae are normal. Right eye exhibits no discharge. Left eye exhibits no discharge. No scleral icterus.   Neck: No tracheal deviation present.   Pulmonary/Chest: Effort normal.   Abdominal: Soft. Bowel sounds are normal.   No flank pain   Neurological: She is alert and oriented to person, place, and time.   Skin: Skin is warm and dry. She is not diaphoretic. No pallor.   Psychiatric:   Anxious with occasional fidgeting, but overall very reasonable with good judgment   Nursing note and vitals reviewed.      UA with microscopic hematuria with positive WBCs, 2+ bacteria, 3-6 squames with positive nitrites and leuk esterase    Assessment/Plan     Diagnosis Plan   1. Bipolar 1 disorder (CMS/HCC)  Ambulatory Referral to Psychology    Ambulatory " Referral to Psychiatry    Urine Drug Screen - Urine, Clean Catch    clonazePAM (KlonoPIN) 2 MG tablet   2. Acute cystitis with hematuria  Urinalysis With Culture If Indicated - Urine, Clean Catch    nitrofurantoin, macrocrystal-monohydrate, (MACROBID) 100 MG capsule   3. Grief  Ambulatory Referral to Psychology   4. Chronic prescription benzodiazepine use  Urine Drug Screen - Urine, Clean Catch   5. Smoker 2 PPD X 30 years  Full Pulmonary Function Test With Bronchodilator   6. Chronic obstructive pulmonary disease, unspecified COPD type (CMS/[facility])  Full Pulmonary Function Test With Bronchodilator   -Seems very reasonable at this visit after discussion that she would benefit from different mood stabilization as Lamictal may not be the best mood stabilizer even though she did not tolerate Seroquel.  In addition we discussed the fact that Paxil may be worsening her anxiety and potentially inducing hypomania, as well as the use of clonazepam may be worsening her depression and it does carry side effects of increase risk of dementia, all the more reason to refer to psychiatry for further assistance at this point.  She agrees to do so, I did provide her a refill of clonazepam 2 mg 3 times daily as needed #90, but informed her to try to use 1 mg 3 times daily as needed as needed.  She agrees to try to do so, will follow up with psychiatry  -Referral to psychology for counseling given history of long-standing grief  -Macrobid x5 days for UTI  -Long-standing history of smoking and COPD without recent PFT testing which was ordered today    An After Visit Summary was printed and given to the patient at discharge.  Return in about 6 weeks (around 8/30/2019).         Nilesh Costa D.O.  [facility]  Osteopathic Neuromusculoskeletal Medicine  7/19/2019   Valtrex Counseling: I discussed with the patient the risks of valacyclovir including but not limited to kidney damage, nausea, vomiting and severe allergy.  The patient understands that if the infection seems to be worsening or is not improving, they are to call.

## 2019-08-06 DIAGNOSIS — S06.5XAA SDH (SUBDURAL HEMATOMA) (HCC): Primary | ICD-10-CM

## 2019-08-06 RX ORDER — HYDROCODONE BITARTRATE AND ACETAMINOPHEN 5; 325 MG/1; MG/1
1 TABLET ORAL EVERY 6 HOURS PRN
Qty: 10 TABLET | Refills: 0 | Status: SHIPPED | OUTPATIENT
Start: 2019-08-06

## 2019-08-06 NOTE — TELEPHONE ENCOUNTER
Patient calls requesting refill of pain medication. She is requesting a larger quantity with a higher mg. Per Dr. Amezquita, refill what was originally prescribed and will not fill anymore. Take Tylenol and IBU for pain after Rx is out.     Have attempted to contact patient, no answer, left message requesting call back to discuss her follow up appointment in our office.

## 2019-08-06 NOTE — PAYOR COMM NOTE
"[id number]  [place] home 8-4-19  Fax   Judie Montes (53 y.o. Female)     Date of Birth Social Security Number Address Home Phone MRN    1965  [id number] [place] [id number] [phone] [id number]    Yarsanism Marital Status          [facility] Single       Admission Date Admission Type Admitting Provider Attending Provider Department, Room/Bed    8/3/19 Urgent Jose Amezquita MD  [facility] INTENSIVE CARE, I008/1    Discharge Date Discharge Disposition Discharge Destination        8/4/2019 Home or Self Care              Attending Provider:  (none)   Allergies:  Codeine    Isolation:  None   Infection:  None   Code Status:  Prior    Ht:  172.7 cm (68\")   Wt:  85.4 kg (188 lb 3.2 oz)    Admission Cmt:  None   Principal Problem:  None                Active Insurance as of 8/3/2019     Primary Coverage     Payor Plan Insurance Group Employer/Plan Group    ANTHEM BLUE CROSS ANTHEM BLUE CROSS BLUE SHIELD O [id number]     Payor Plan Address Payor Plan Phone Number Payor Plan Fax Number Effective Dates    [address] [phone]  1/1/2019 - None Entered    [place] [id number]       Subscriber Name Subscriber Birth Date Member ID       JUDIE MONTES 1965 [id number]                 Emergency Contacts      (Rel.) Home Phone Work Phone Mobile Phone    KRIS LAL (Significant Other) -- -- [phone]               Discharge Summary      Jose Amezquita MD at 8/4/2019  2:06 PM            Date of Discharge:  8/4/2019    Discharge Diagnosis:   Patient Active Problem List   Diagnosis   • Chest pain   • HTN (hypertension)   • Smoker 2 PPD X 30 years   • Family history of early CAD   • Abnormal nuclear stress test Apical ischemia Francesca   • Bipolar 1 disorder (CMS/HCC)   • SDH (subdural hematoma) (CMS/HCC)     1. SDH (subdural hematoma) (CMS/HCC)        Presenting Problem/History of Present Illness  SDH (subdural hematoma) (CMS/HCC) " [S06.5X9A]   1. SDH (subdural hematoma) (CMS/[facility])        Hospital Course  Judie Alba is a 53 y.o. female with a significant comorbidity anxiety, depression, bipolar 1, chronic alcoholism and hepatitis C.  She presents with a new problem of fall from standing height while intoxicated. Physical exam findings of ecchymosis around her right periorbital region, conjunctival hemorrhage, and bilateral Nguyen's with hyperreflexia but otherwise neurologically intact.  Their imaging shows 8 mm right acute subdural hematoma and's 2 small cerebral contusions.    She is done well since admission.  Repeat head CT showed stability of her right subdural hematoma.  She is remained neurologically intact.  Her right eye swelling has gone down significantly.  We elected not to put her on Keppra as she is already on Lamictal.  This is for bipolar 1 and mood stabilization.  I would like to see her in 6 weeks with a noncontrast head CT.    Regarding her hyperreflexia a noncontrast MRI of the cervical spine was performed which showed a C6/7 moderate stenosis.  We discussed this condition and reviewed her films with her.  I would like to see her in approximately 6 weeks to discuss elective surgical intervention.    Given her dizziness and gait difficulties we will prescribe for outpatient ambulatory physical therapy and occupational therapy.  I have advised her to greatly curtail if not discontinue her drinking.      Procedures Performed         Consults:   Consults     No orders found for last 30 day(s).          Pertinent Test Results:   Imaging Results (last 24 hours)     Procedure Component Value Units Date/Time    MRI Cervical Spine Without Contrast [[id number]] Collected:  08/04/19 0952     Updated:  08/04/19 0959    Narrative:       EXAMINATION:   MRI CERVICAL SPINE WO CONTRAST-  8/4/2019 9:52 AM CDT     HISTORY: MRI CERVICAL SPINE WO CONTRAST- 8/4/2019 9:05 AM CDT     HISTORY: cerv stenosis     COMPARISON: None      TECHNIQUE:  Multiplanar, multisequence MRI of the cervical spine was  obtained without contrast.      FINDINGS:   Imaged portions of the cerebellum and brainstem are unremarkable.      Alignment: Normal cervical lordosis is maintained. There is no evidence  of listhesis or subluxation.      Marrow signal: No pathologic marrow infiltrate is demonstrated. The  vertebral body heights and posterior elements are maintained.      Cord/Canal: The spinal cord is normal in signal and morphology.      Soft tissues: The surrounding soft tissues are unremarkable.      Levels:   C2-C3: No disc bulge is present. No significant neuroforaminal or  central canal stenosis is seen.      C3-C4: Broad-based disc hypertrophic osteophyte complex is present this  compromises the right and left neural foramen..      C4-C5: No disc bulge is present. No significant neuroforaminal or  central canal stenosis is seen.      C5-C6: Mild uncinate spurring is noted on the right. The left neural  foramen is patent..      C6-C7: There is flattening of the cord. Posterior facet joint  hypertrophy and a broad-based disc. There is uncinate spurring  compromising the left intervertebral neural foramen and encroaching on  the right neural foramen C6-7 level. Central canal stenosis is present  with an AP diameter of 7 mm..      C7-T1: No disc bulge is present. No significant neuroforaminal or  central canal stenosis is seen.        Impression:       1. Degenerative spondylosis is noted at described above        This report was finalized on 08/04/2019 09:55 by Dr. Reji Hernandez MD.    CT Head Without Contrast [[id number]] Collected:  08/04/19 0701     Updated:  08/04/19 0706    Narrative:       EXAMINATION:   CT HEAD WO CONTRAST-  8/4/2019 7:01 AM CDT     HISTORY: CT BRAIN without contrast 8/4/2019 12:10 AM CDT     HISTORY: Subdural hematoma     COMPARISON: None      DLP: 640 mGy cm     TECHNIQUE: Serial axial tomographic images of the brain were obtained  without the  use of intravenous contrast.      FINDINGS:   The midline structures are nondisplaced. There is mild cerebral and  cerebellar volume loss, with an associated increase in the prominence of  the ventricles and sulci. The basilar cisterns are normal in size and  configuration. There is no evidence of intracranial hemorrhage or  mass-effect. A right frontal extra-axial subdural hematoma present. This  is approximately 6 mm. There is no associated midline shift. There is a  subtle subarachnoid component in the right frontal lobe.. There is no  evidence of tonsillar herniation.      The included orbits and their contents are unremarkable. The visualized  paranasal sinuses, mastoid air cells and middle ear cavities are clear.  The visualized osseous structures and overlying soft tissues of the  skull and face are intact.        Impression:       6 mm right frontal subdural hematoma. There is no associated mass effect  or midline shift        This report was finalized on 08/04/2019 07:03 by Dr. Reji Hernandez MD.        Lab Results (last 24 hours)     Procedure Component Value Units Date/Time    Pregnancy, Urine - Urine, Clean Catch [[id number]]  (Normal) Collected:  08/03/19 2055    Specimen:  Urine, Clean Catch Updated:  08/03/19 2106     HCG, Urine QL Negative    Comprehensive Metabolic Panel [[id number]] Collected:  08/03/19 1943    Specimen:  Blood from Arm, Right Updated:  08/03/19 1956     Glucose 92 mg/dL      BUN 5 mg/dL      Creatinine 0.56 mg/dL      Sodium 140 mmol/L      Potassium 4.3 mmol/L      Chloride 105 mmol/L      CO2 28.0 mmol/L      Calcium 8.9 mg/dL      Total Protein 7.3 g/dL      Albumin 3.90 g/dL      ALT (SGPT) 49 U/L      AST (SGOT) 45 U/L      Alkaline Phosphatase 60 U/L      Total Bilirubin 0.5 mg/dL      eGFR Non African Amer 113 mL/min/1.73      Globulin 3.4 gm/dL      A/G Ratio 1.1 g/dL      BUN/Creatinine Ratio 8.9     Anion Gap 7.0 mmol/L     Narrative:       GFR Normal >60  Chronic Kidney  Disease <60  Kidney Failure <15    CBC (No Diff) [[id number]]  (Abnormal) Collected:  08/03/19 1942    Specimen:  Blood Updated:  08/03/19 1947     WBC 9.78 10*3/mm3      RBC 4.43 10*6/mm3      Hemoglobin 14.4 g/dL      Hematocrit 42.6 %      MCV 96.2 fL      MCH 32.5 pg      MCHC 33.8 g/dL      RDW 13.2 %      RDW-SD 47.2 fl      MPV 8.7 fL      Platelets 197 10*3/mm3           Condition on Discharge: Stable    Vital Signs  Temp:  [97.8 °F (36.6 °C)-98.7 °F (37.1 °C)] 98.7 °F (37.1 °C)  Heart Rate:  [64-99] 68  Resp:  [12-21] 15  BP: ()/(45-88) 105/59    Physical Exam:   Physical Exam   Constitutional: She is oriented to person, place, and time.   Eyes: EOM are normal. Pupils are equal, round, and reactive to light.   Neurological: She is oriented to person, place, and time.   Reflex Scores:       Tricep reflexes are 3+ on the right side and 3+ on the left side.       Bicep reflexes are 3+ on the right side and 3+ on the left side.       Brachioradialis reflexes are 3+ on the right side and 3+ on the left side.       Patellar reflexes are 3+ on the right side and 3+ on the left side.  Psychiatric: Her speech is normal.        Neurologic Exam     Mental Status   Oriented to person, place, and time.   Speech: speech is normal   Level of consciousness: alert    Cranial Nerves     CN III, IV, VI   Pupils are equal, round, and reactive to light.  Extraocular motions are normal.     CN V   Facial sensation intact.     CN VII   Facial expression full, symmetric.     Motor Exam   Right arm pronator drift: absent  Left arm pronator drift: absent    Strength   Right deltoid: 5/5  Left deltoid: 5/5  Right biceps: 5/5  Left biceps: 5/5  Right triceps: 5/5  Left triceps: 5/5  Right iliopsoas: 5/5  Left iliopsoas: 5/5  Right quadriceps: 5/5  Left quadriceps: 5/5  Right gastroc: 5/5  Left gastroc: 5/5    Sensory Exam   Light touch normal.     Gait, Coordination, and Reflexes     Reflexes   Right brachioradialis: 3+  Left  brachioradialis: 3+  Right biceps: 3+  Left biceps: 3+  Right triceps: 3+  Left triceps: 3+  Right patellar: 3+  Left patellar: 3+  Right Nguyen: present  Left Nguyen: present      Discharge Disposition  Home or Self Care    Discharge Medications     Discharge Medications      New Medications      Instructions Start Date   HYDROcodone-acetaminophen 5-325 MG per tablet  Commonly known as:  NORCO   1 tablet, Oral, Every 6 Hours PRN         Continue These Medications      Instructions Start Date   bisoprolol-hydrochlorothiazide 5-6.25 MG per tablet  Commonly known as:  ZIAC   1 tablet, Oral, Daily      clonazePAM 2 MG tablet  Commonly known as:  KlonoPIN   1 tablet, Oral, 3 Times Daily PRN      COMBIVENT RESPIMAT  MCG/ACT inhaler  Generic drug:  ipratropium-albuterol   INHALE 1 PUFF PO QID      cyclobenzaprine 5 MG tablet  Commonly known as:  FLEXERIL   1 tablet, Oral, Every 8 Hours Scheduled      lamoTRIgine  MG tablet sustained-release 24 hour   1 tablet, Oral, Nightly      levothyroxine 125 MCG tablet  Commonly known as:  SYNTHROID, LEVOTHROID   125 mcg, Oral, Daily      nitroglycerin 0.4 MG SL tablet  Commonly known as:  NITROSTAT   TK 1 T PO UNDER TONGUE Q 5 MIN UP TO 3 TABS PRN      NON FORMULARY   Tumeric      NON FORMULARY   Barbara Force       PARoxetine 40 MG tablet  Commonly known as:  PAXIL   Oral, Daily      Unable to find   1 each, Oral, 3 Times Daily, Liver care              Discharge Diet:  Regular    Activity at Discharge:  As tolerated    Follow-up Appointments  Future Appointments   Date Time Provider Department Center   8/12/2019  1:30 PM  [facility] PULM LAB ROOM 1  [facility] PFT [facility]   9/17/2019  1:00 PM Nilesh Costa DO [facility] None     Additional Instructions for the Follow-ups that You Need to Schedule     Ambulatory Referral to Occupational Therapy   As directed      Ambulatory Referral to Physical Therapy   As directed            Test Results Pending at Discharge       Jose Rothman  MD [facility]  08/04/19  2:08 PM    Time: Discharge 45 min      Electronically signed by Joes Amezquita MD at 8/4/2019  2:09 PM

## 2019-08-09 ENCOUNTER — TELEPHONE (OUTPATIENT)
Dept: NEUROSURGERY | Facility: CLINIC | Age: 54
End: 2019-08-09

## 2019-08-09 NOTE — TELEPHONE ENCOUNTER
"Patient called to inform our office that she was hearing voices in her head. She reports hearing her  discussion \"guns\" but her  was no where near. Also, she has been hearing her sister in law, who does not even live with them. She wants our office to be aware.   "

## 2019-08-12 ENCOUNTER — TELEPHONE (OUTPATIENT)
Dept: INTERNAL MEDICINE | Facility: CLINIC | Age: 54
End: 2019-08-12

## 2019-08-12 DIAGNOSIS — R11.0 NAUSEA: Primary | ICD-10-CM

## 2019-08-12 RX ORDER — ONDANSETRON 4 MG/1
4 TABLET, ORALLY DISINTEGRATING ORAL EVERY 8 HOURS PRN
Qty: 30 TABLET | Refills: 3 | Status: SHIPPED | OUTPATIENT
Start: 2019-08-12

## 2019-08-12 NOTE — TELEPHONE ENCOUNTER
On 8/6/2019 the patient got a refill for pain medicine from neurosurgery, and the surgeon recommended Tylenol or ibuprofen following that last prescription.  I did call her in Zofran, but I think she should use over-the-counter medicines for pain as directed by her surgeon

## 2019-08-12 NOTE — TELEPHONE ENCOUNTER
PT SAYS SHE FELL ON Saturday NIGHT AND WENT TO THE HOSPITAL..SHE SAYS SHE WAS IN ICU.PLEASE CALL IN ZOFRAN AND  AND PAIN MEDICATION. SHE STATES SHE IS IN SO MUCH. PLEASE USE SHANNON SCHREIBER

## 2019-08-12 NOTE — TELEPHONE ENCOUNTER
I spoke with the patient and let her know that Zofran was sent, will need an OV for RX.  I also spoke with [facility] in pulmonology where she is scheduled for PFT today, and it will need to be rescheduled.    ELLIOT

## 2019-08-19 DIAGNOSIS — M48.02 SPINAL STENOSIS IN CERVICAL REGION: Primary | ICD-10-CM

## 2019-08-28 ENCOUNTER — TELEPHONE (OUTPATIENT)
Dept: INTERNAL MEDICINE | Facility: CLINIC | Age: 54
End: 2019-08-28

## 2019-08-28 DIAGNOSIS — Z79.899 CHRONIC PRESCRIPTION BENZODIAZEPINE USE: Primary | ICD-10-CM

## 2019-08-28 NOTE — TELEPHONE ENCOUNTER
I want to discuss refill of this medication with OV, especially after her recent fall while being intoxicated. Please have her collect UDS ordered today

## 2019-08-28 NOTE — TELEPHONE ENCOUNTER
Pt called requesting refills on clonazePAM (KlonoPIN) 2 MG tablet.  She stated that Dr. Costa did want her to cut back and she is proud to say that she feels that she can go down to 1mg tid.   She did state that she is still shaky but it does go away.

## 2019-08-29 ENCOUNTER — TELEPHONE (OUTPATIENT)
Dept: INTERNAL MEDICINE | Facility: CLINIC | Age: 54
End: 2019-08-29

## 2019-08-29 DIAGNOSIS — R30.0 DYSURIA: Primary | ICD-10-CM

## 2019-08-29 NOTE — TELEPHONE ENCOUNTER
I spoke with the patient, she states that she has frequency and burning with urination.  I told her that I would ask you to add a UA and culture to her UDS.  The patient would like to do all of this at [facility], she is going to call back with a fax number.  I informed her that if she does not come here, we may not get results today, and she most likely will not get her requested refill.   ELLIOT

## 2019-09-03 ENCOUNTER — TELEPHONE (OUTPATIENT)
Dept: NEUROSURGERY | Facility: CLINIC | Age: 54
End: 2019-09-03

## 2019-09-03 ENCOUNTER — TELEPHONE (OUTPATIENT)
Dept: INTERNAL MEDICINE | Facility: CLINIC | Age: 54
End: 2019-09-03

## 2019-09-03 RX ORDER — CLONAZEPAM 0.5 MG/1
0.5 TABLET ORAL 2 TIMES DAILY PRN
Qty: 14 TABLET | Refills: 0 | Status: SHIPPED | OUTPATIENT
Start: 2019-09-10

## 2019-09-03 RX ORDER — CLONAZEPAM 1 MG/1
1 TABLET ORAL 2 TIMES DAILY PRN
Qty: 14 TABLET | Refills: 0 | Status: SHIPPED | OUTPATIENT
Start: 2019-09-03

## 2019-09-03 RX ORDER — CLONAZEPAM 0.5 MG/1
0.25 TABLET ORAL 2 TIMES DAILY PRN
Qty: 7 TABLET | Refills: 0 | Status: SHIPPED | OUTPATIENT
Start: 2019-09-17

## 2019-09-03 NOTE — TELEPHONE ENCOUNTER
"Patient calls requesting that Dr. Amezquita admit her to the hospital for detox of her Klonipin medication. My impression from the patient is that her provider recently stopped this medication due to her SDH and that she is \"withdrawling\" and wanting medication from us, or to be admitted. (Of note, patient has been off this medication for approximately 1 month). I explained to patient that is outside of our area of treatment with her, and encouraged she contact her PCP or physiatrist. She informed me that she \"did not need help, just wanted medication\". Patient requested a new CT of her head be ordered due to the voices she is hearing in her head. Advised those are not symptoms of SDH and that if we needed repeat scan, we would order at her next visit. I have encouraged her multiple times to contact her physiatrist for help with medication. [place] overheard this conversation.   "

## 2019-09-03 NOTE — PROGRESS NOTES
Spoke with patient today regarding Klonopin refill requested last week, I have ordered a urine drug screen and she had an office visit scheduled last Friday that she did not show up for, says that due to her inability to drive she has not had her urine drug screen.  She reports that she has been out of clonazepam for 5 days.     I expressed to her that it is not safe for her to be on this medication following her subdural hematoma and reported alcohol use of 48 beers weekly.  She currently reports that since hospital discharge she has not been drinking, but I still maintain that this is not a safe medication for her to continue long-term given that history.  She says that she will not be able to see psychiatry for 2 months, she does have an appointment with me on the 17th.    Her last refill of clonazepam was on 6/14/2019 for 2 mg 3 times daily as needed, and last week she reported that she thought that she could taper down to 1 mg 3 times daily as needed.     Today I wrote the following taper:  Starting today Klonopin 1 mg twice daily as needed #14 for 7 days  On 9/10/2019 reduce Klonopin to 0.5 mg twice daily as needed #14 for 7 days  On 9/17/2019 reduce Klonopin to 0.25 mg twice daily as needed #7 for 7 days    Will readdress at follow-up, pt understands plan in place

## 2019-09-03 NOTE — TELEPHONE ENCOUNTER
Patient called this am stating that she needs to know what to do about her withdrawal symptoms coming off her Klonopin, which she states it has been 5 days since she has taken one, but she told Dr Amezquita's office that it has been a month when she called them asking for the Klonopin earlier today. I transferred her to Dr Costa.   MM

## 2020-12-03 ENCOUNTER — TELEPHONE (OUTPATIENT)
Dept: NEUROSURGERY | Facility: CLINIC | Age: 55
End: 2020-12-03

## 2024-05-20 NOTE — ADDENDUM NOTE
Addended by: ARMAAN ALBERTS on: 8/12/2019 10:48 AM     Modules accepted: Dulce     Plan: If no improvement with oral antibiotic and ILK, recommended excision with Dr. Herrera Continue Regimen: Keflex 500mg: Take 1 tablet BID for 7 days (Prescribed by urgent care) Detail Level: Zone

## (undated) DEVICE — CATH F5 INF PIG 110CM 6SH: Brand: INFINITI

## (undated) DEVICE — MODEL BT2000 P/N 700287-012KIT CONTENTS: MANIFOLD WITH SALINE AND CONTRAST PORTS, SALINE TUBING WITH SPIKE AND HAND SYRINGE, TRANSDUCER: Brand: BT2000 AUTOMATED MANIFOLD KIT

## (undated) DEVICE — PK CATH CARD 30

## (undated) DEVICE — SOL NACL INJ 0.9PCT 50ML

## (undated) DEVICE — TR BAND RADIAL ARTERY COMPRESSION DEVICE: Brand: TR BAND

## (undated) DEVICE — GW STARTER FXD CORE J .035 3X150CM 3MM

## (undated) DEVICE — SOLIDIFIER LIQ ISOLYSER LTS/PLS 3000CC

## (undated) DEVICE — ELECTRD PAD DEFIB A/

## (undated) DEVICE — SUP ARMBRD ART/LINE BLU

## (undated) DEVICE — MODEL AT P65, P/N 701554-001KIT CONTENTS: HAND CONTROLLER, 3-WAY HIGH-PRESSURE STOPCOCK WITH ROTATING END AND PREMIUM HIGH-PRESSURE TUBING: Brand: ANGIOTOUCH® KIT

## (undated) DEVICE — SOL NS 500ML

## (undated) DEVICE — SOL IRR NACL 0.9PCT BT 1000ML

## (undated) DEVICE — RADIFOCUS OPTITORQUE ANGIOGRAPHIC CATHETER: Brand: OPTITORQUE

## (undated) DEVICE — GLIDESHEATH SLENDER STAINLESS STEEL KIT: Brand: GLIDESHEATH SLENDER

## (undated) DEVICE — CANN CO2/O2 NASL A/